# Patient Record
Sex: MALE | Race: WHITE | Employment: FULL TIME | URBAN - METROPOLITAN AREA
[De-identification: names, ages, dates, MRNs, and addresses within clinical notes are randomized per-mention and may not be internally consistent; named-entity substitution may affect disease eponyms.]

---

## 2017-05-12 ENCOUNTER — OFFICE VISIT (OUTPATIENT)
Dept: UROLOGY | Facility: CLINIC | Age: 32
End: 2017-05-12
Payer: COMMERCIAL

## 2017-05-12 VITALS
DIASTOLIC BLOOD PRESSURE: 68 MMHG | BODY MASS INDEX: 30.1 KG/M2 | WEIGHT: 215 LBS | HEIGHT: 71 IN | SYSTOLIC BLOOD PRESSURE: 114 MMHG | HEART RATE: 76 BPM

## 2017-05-12 DIAGNOSIS — Z30.2 ENCOUNTER FOR STERILIZATION: Primary | ICD-10-CM

## 2017-05-12 PROCEDURE — 99203 OFFICE O/P NEW LOW 30 MIN: CPT | Performed by: UROLOGY

## 2017-05-12 RX ORDER — DIAZEPAM 10 MG
10 TABLET ORAL EVERY 6 HOURS PRN
Qty: 1 TABLET | Refills: 0 | Status: SHIPPED | OUTPATIENT
Start: 2017-05-12 | End: 2019-07-08

## 2017-05-12 ASSESSMENT — PAIN SCALES - GENERAL: PAINLEVEL: NO PAIN (0)

## 2017-05-12 NOTE — MR AVS SNAPSHOT
After Visit Summary   5/12/2017    Yuniel Mckeon    MRN: [id number]           Patient Information     Date Of Birth          1985        Visit Information        Provider Department      5/12/2017 1:40 PM Duncan Akbar MD [facility] [place]        Today's Diagnoses     Encounter for sterilization    -  1      Care Instructions    [facility]  Vasectomy Information  [phone]    DATE OF PROCEDURE ___________________________________    TIME OF PROCEDURE ___________________________    TIME TO REPORT FOR PROCEDURE _______________________________    Your Physician:  _____ Marin Montesinos M.D.     _____ Duncan Akbar M.D.     _____ Toribio Bruno M.D.    LOCATION OF PROCEDURE:     _____ [facility]  _____ [address]   [address]   [address] [facility].  [address]    [place]   [address]    [place]   [address]    Preoperative Instructions:    _____ You may have breakfast on the morning of your procedure.  If your procedure is    in the afternoon, you may have lunch as well.    _____ You must have someone drive you home after the procedure if you have been    prescribed an oral sedative (valium).    _____ Do not take any aspirin, blood-thinning or anti-inflammatory medication for at    least 7-10 days before the procedure (this includes but is not limited to Advil,   Aleve, Ecotrin and Bufferin).      Postoperative Instructions Follow Vasectomy    Under routine circumstances, please note the following:    -No heavy lifting (over 15 lbs) for 48 hour.  -You may shower after 48 hours.  You may have a tub bath or use a swimming pool after one week postoperatively.  Your doctor will advise you if he feels it is helpful to soak in a bathtub postoperatively.  -Do not engage in intercourse for at least ten days and then proceed when comfortable.  -Wear an athletic supporter for 48 hours postoperatively or  "until any discomfort ceases.  -Your physician will instruct you regarding the use of ice in the recovery room or at home after the procedure, as necessary.  -Please remember as you resume your activity that you may experience some discomfor and/or swelling for the one to two weeks following the procedure.  If this occurs decrease activity and slightly elevate the scrotum (athletic supporter).  -As your stitches dissolve it may appear that the incision is \"gaping.\"  This is normal.    Necessary follow-up:  You do not need a follow up appointment unless you have problems after your procedure.  Please call our office at [phone] if you do.    At the time of your procedure you will be given the supplies for your post procedure follow up.  The test should be done AT LEAST THREE MONTHS AFTER your vasecomy.  During this time, be certain to maintain birth control measure!    Between the time of your procedure and the time that you have your first sperm count it is very important that you have a minimum of 30 ejaculations.  If you have any questions about this, please consult your physician.    If the sperm count that is done at three months is negative, you will be considered sterile.  Until, you are told that you are free to discontinue birth control you are considered fertile.    If your sperm counts reveal the presence of sperm, you will be advised to repeat the test until a negative result is obtained.     NOTE:  Please call our office one week after dropping off your sample for the result.  Test results will only be given to the patient.                 Follow-ups after your visit        Follow-up notes from your care team     Return for vasectomy.      Your next 10 appointments already scheduled     Oct 06, 2017  9:30 AM CDT   Vasectomy with Toribio Bruno MD   [facility] [place] ([facility])    [address]  [address]  [facility] " "[phone]              Who to contact     If you have questions or need follow up information about today's clinic visit or your schedule please contact [facility] [place] directly at [phone].  Normal or non-critical lab and imaging results will be communicated to you by Nukonahart, letter or phone within 4 business days after the clinic has received the results. If you do not hear from us within 7 days, please contact the clinic through Nukonahart or phone. If you have a critical or abnormal lab result, we will notify you by phone as soon as possible.  Submit refill requests through Joyus or call your pharmacy and they will forward the refill request to us. Please allow 3 business days for your refill to be completed.          Additional Information About Your Visit        Nukonahart Information     Joyus gives you secure access to your electronic health record. If you see a primary care provider, you can also send messages to your care team and make appointments. If you have questions, please call your primary care clinic.  If you do not have a primary care provider, please call [phone] and they will assist you.        Care EveryWhere ID     This is your Care EveryWhere ID. This could be used by other organizations to access your [place] medical records  [id number]        Your Vitals Were     Pulse Height BMI (Body Mass Index)             76 1.803 m (5' 11\") 29.99 kg/m2          Blood Pressure from Last 3 Encounters:   05/12/17 114/68   12/14/16 122/78   11/10/16 114/74    Weight from Last 3 Encounters:   05/12/17 97.5 kg (215 lb)   12/14/16 99.3 kg (219 lb)   11/10/16 97.5 kg (215 lb)              Today, you had the following     No orders found for display         Today's Medication Changes          These changes are accurate as of: 5/12/17 11:59 PM.  If you have any questions, ask your nurse or doctor.               Start taking these medicines.        " Dose/Directions    diazepam 10 MG tablet   Commonly known as:  VALIUM   Used for:  Encounter for sterilization   Started by:  Duncan Akbar MD        Dose:  10 mg   Take 1 tablet (10 mg) by mouth every 6 hours as needed for anxiety or sleep Take 30-60 minutes before procedure.  Do not operate a vehicle after taking this medication.   Quantity:  1 tablet   Refills:  0            Where to get your medicines      Some of these will need a paper prescription and others can be bought over the counter.  Ask your nurse if you have questions.     Bring a paper prescription for each of these medications     diazepam 10 MG tablet                Primary Care Provider Office Phone # Fax #    Scott Razo [phone]       [facility] [address] [place] [address]        Equal Access to Services     [facility] : Hadii boogie uribe hadasho Somariam, waaxda luqadaha, qaybta kaalmada adeegyada, waxay kerwinin haybrian casanova. So [facility] [phone].    ATENCIÓN: Si habla español, tiene a singh disposición servicios gratuitos de asistencia lingüística. [facility] [phone].    We comply with applicable federal civil rights laws and [place] laws. We do not discriminate on the basis of race, color, national origin, age, disability sex, sexual orientation or gender identity.            Thank you!     Thank you for choosing [facility] [place]  for your care. Our goal is always to provide you with excellent care. Hearing back from our patients is one way we can continue to improve our services. Please take a few minutes to complete the written survey that you may receive in the mail after your visit with us. Thank you!             Your Updated Medication List - Protect others around you: Learn how to safely use, store and throw away your medicines at www.disposemymeds.org.          This list is accurate as of: 5/12/17 11:59 PM.  Always use your most  recent med list.                   Brand Name Dispense Instructions for use Diagnosis    diazepam 10 MG tablet    VALIUM    1 tablet    Take 1 tablet (10 mg) by mouth every 6 hours as needed for anxiety or sleep Take 30-60 minutes before procedure.  Do not operate a vehicle after taking this medication.    Encounter for sterilization

## 2017-05-12 NOTE — LETTER
5/12/2017       RE: Yuniel Mckeon  [address] [facility] [address]     Dear Colleague,    Thank you for referring your patient, Yuniel Mckeon, to the [facility] [place] [facility]. Please see a copy of my visit note below.    History: This very pleasant 32-year-old gentleman, a see me in initial consultation today and is interested in vasectomy.  He is  with 3 children, (the third one is on the way)  Both he and his wife do not wish for further children.  His general health is otherwise satisfactory.  He has had a previous umbilical hernia repair    Past Medical History:   Diagnosis Date     Hernia, abdominal      PONV (postoperative nausea and vomiting)        Past Surgical History:   Procedure Laterality Date     APPENDECTOMY OPEN  2002     HERNIORRHAPHY VENTRAL N/A 11/10/2016    Procedure: HERNIORRHAPHY VENTRAL;  Surgeon: Matt Johnston MD;  Location: [facility] OR       Family History   Problem Relation Age of Onset     Unknown/Adopted Father      Unknown/Adopted Paternal Grandmother      Unknown/Adopted Paternal Grandfather        Social History     Social History     Marital status:      Spouse name: N/A     Number of children: N/A     Years of education: N/A     Occupational History     Not on file.     Social History Main Topics     Smoking status: Never Smoker     Smokeless tobacco: Never Used     Alcohol use 0.0 oz/week     0 Standard drinks or equivalent per week      Comment: Occas     Drug use: No     Sexual activity: Yes     Partners: Female     Other Topics Concern     Parent/Sibling W/ Cabg, Mi Or Angioplasty Before 65f 55m? No     Social History Narrative       Current Outpatient Prescriptions   Medication Sig Dispense Refill     diazepam (VALIUM) 10 MG tablet Take 1 tablet (10 mg) by mouth every 6 hours as needed for anxiety or sleep Take 30-60 minutes before procedure.  Do not operate a vehicle after taking  "this medication. 1 tablet 0       Review Of Systems:  Skin: negative  Eyes: negative  Ears/Nose/Throat: negative  Respiratory: No shortness of breath, dyspnea on exertion, cough, or hemoptysis  Cardiovascular: negative  Gastrointestinal: Umbilical hernia  Genitourinary: negative  Musculoskeletal: negative  Neurologic: negative  Psychiatric: negative  Hematologic/Lymphatic/Immunologic: negative  Endocrine: negative    Exam:  /68 (BP Location: Left arm, Patient Position: Chair, Cuff Size: Adult Regular)  Pulse 76  Ht 1.803 m (5' 11\")  Wt 97.5 kg (215 lb)  BMI 29.99 kg/m2    General Impression: Very pleasant gentleman in no acute distress, well-oriented in time place and person.    Mental status.  Normal    HEENT: There is no evidence of jaundice and the mucous membranes are normal    Skin: The skin is normal to examination    Lymph Nodes: There is no inguinal lymphadenopathy    Respiratory System: The respiratory cycle is normal    Cardiovascular: Not examined    Abdominal: Unremarkable but mildly obese abdomen, healed surgical sites as a result of umbilical hernia repair.  No evidence of any other hernia.    Extremities: There is no peripheral edema    Back and Flank: Not examined    Genital: Both vas deferens easily palpable.  Penis uncircumcised and normal size meatus    Rectal: Not examined    Neurologic:  There are no focal clinical abnormal neurological signs in the central, or peripheral nervous systems    Impression: I explained the procedure of vasectomy to the patient in detail today.  I went over all the potential side effects and complications associated with the procedure.  I explained the importance of not discontinuing normal contraceptive precautions until we have demonstrated a negative sperm count of the procedure.  I answered all the questions    Plan: Vasectomy    \"This dictation was performed with voice recognition software and may contain errors,  omissions and inadvertent word " "substitution.\"          Again, thank you for allowing me to participate in the care of your patient.      Sincerely,    Duncan Akbar MD      "

## 2017-05-12 NOTE — PROGRESS NOTES
History: This very pleasant 32-year-old gentleman, a see me in initial consultation today and is interested in vasectomy.  He is  with 3 children, (the third one is on the way)  Both he and his wife do not wish for further children.  His general health is otherwise satisfactory.  He has had a previous umbilical hernia repair    Past Medical History:   Diagnosis Date     Hernia, abdominal      PONV (postoperative nausea and vomiting)        Past Surgical History:   Procedure Laterality Date     APPENDECTOMY OPEN  2002     HERNIORRHAPHY VENTRAL N/A 11/10/2016    Procedure: HERNIORRHAPHY VENTRAL;  Surgeon: Matt Johnston MD;  Location: [facility] OR       Family History   Problem Relation Age of Onset     Unknown/Adopted Father      Unknown/Adopted Paternal Grandmother      Unknown/Adopted Paternal Grandfather        Social History     Social History     Marital status:      Spouse name: N/A     Number of children: N/A     Years of education: N/A     Occupational History     Not on file.     Social History Main Topics     Smoking status: Never Smoker     Smokeless tobacco: Never Used     Alcohol use 0.0 oz/week     0 Standard drinks or equivalent per week      Comment: Occas     Drug use: No     Sexual activity: Yes     Partners: Female     Other Topics Concern     Parent/Sibling W/ Cabg, Mi Or Angioplasty Before 65f 55m? No     Social History Narrative       Current Outpatient Prescriptions   Medication Sig Dispense Refill     diazepam (VALIUM) 10 MG tablet Take 1 tablet (10 mg) by mouth every 6 hours as needed for anxiety or sleep Take 30-60 minutes before procedure.  Do not operate a vehicle after taking this medication. 1 tablet 0       Review Of Systems:  Skin: negative  Eyes: negative  Ears/Nose/Throat: negative  Respiratory: No shortness of breath, dyspnea on exertion, cough, or hemoptysis  Cardiovascular: negative  Gastrointestinal: Umbilical hernia  Genitourinary: negative  Musculoskeletal:  "negative  Neurologic: negative  Psychiatric: negative  Hematologic/Lymphatic/Immunologic: negative  Endocrine: negative    Exam:  /68 (BP Location: Left arm, Patient Position: Chair, Cuff Size: Adult Regular)  Pulse 76  Ht 1.803 m (5' 11\")  Wt 97.5 kg (215 lb)  BMI 29.99 kg/m2    General Impression: Very pleasant gentleman in no acute distress, well-oriented in time place and person.    Mental status.  Normal    HEENT: There is no evidence of jaundice and the mucous membranes are normal    Skin: The skin is normal to examination    Lymph Nodes: There is no inguinal lymphadenopathy    Respiratory System: The respiratory cycle is normal    Cardiovascular: Not examined    Abdominal: Unremarkable but mildly obese abdomen, healed surgical sites as a result of umbilical hernia repair.  No evidence of any other hernia.    Extremities: There is no peripheral edema    Back and Flank: Not examined    Genital: Both vas deferens easily palpable.  Penis uncircumcised and normal size meatus    Rectal: Not examined    Neurologic:  There are no focal clinical abnormal neurological signs in the central, or peripheral nervous systems    Impression: I explained the procedure of vasectomy to the patient in detail today.  I went over all the potential side effects and complications associated with the procedure.  I explained the importance of not discontinuing normal contraceptive precautions until we have demonstrated a negative sperm count of the procedure.  I answered all the questions    Plan: Vasectomy    \"This dictation was performed with voice recognition software and may contain errors,  omissions and inadvertent word substitution.\"        "

## 2017-08-09 NOTE — PATIENT INSTRUCTIONS
[facility]  Vasectomy Information  [phone]    DATE OF PROCEDURE ___________________________________    TIME OF PROCEDURE ___________________________    TIME TO REPORT FOR PROCEDURE _______________________________    Your Physician:  _____ Marin Montesinos M.D.     _____ Duncan Akbar M.D.     _____ Toribio Bruno M.D.    LOCATION OF PROCEDURE:     _____ [facility]  _____ [address]   [address]   [address] [facility].  [address]    [place]   [address]    [place]   [address]    Preoperative Instructions:    _____ You may have breakfast on the morning of your procedure.  If your procedure is    in the afternoon, you may have lunch as well.    _____ You must have someone drive you home after the procedure if you have been    prescribed an oral sedative (valium).    _____ Do not take any aspirin, blood-thinning or anti-inflammatory medication for at    least 7-10 days before the procedure (this includes but is not limited to Advil,   Aleve, Ecotrin and Bufferin).      Postoperative Instructions Follow Vasectomy    Under routine circumstances, please note the following:    -No heavy lifting (over 15 lbs) for 48 hour.  -You may shower after 48 hours.  You may have a tub bath or use a swimming pool after one week postoperatively.  Your doctor will advise you if he feels it is helpful to soak in a bathtub postoperatively.  -Do not engage in intercourse for at least ten days and then proceed when comfortable.  -Wear an athletic supporter for 48 hours postoperatively or until any discomfort ceases.  -Your physician will instruct you regarding the use of ice in the recovery room or at home after the procedure, as necessary.  -Please remember as you resume your activity that you may experience some discomfor and/or swelling for the one to two weeks following the procedure.  If this occurs decrease activity and slightly elevate the scrotum (athletic  "supporter).  -As your stitches dissolve it may appear that the incision is \"gaping.\"  This is normal.    Necessary follow-up:  You do not need a follow up appointment unless you have problems after your procedure.  Please call our office at [phone] if you do.    At the time of your procedure you will be given the supplies for your post procedure follow up.  The test should be done AT LEAST THREE MONTHS AFTER your vasecomy.  During this time, be certain to maintain birth control measure!    Between the time of your procedure and the time that you have your first sperm count it is very important that you have a minimum of 30 ejaculations.  If you have any questions about this, please consult your physician.    If the sperm count that is done at three months is negative, you will be considered sterile.  Until, you are told that you are free to discontinue birth control you are considered fertile.    If your sperm counts reveal the presence of sperm, you will be advised to repeat the test until a negative result is obtained.     NOTE:  Please call our office one week after dropping off your sample for the result.  Test results will only be given to the patient.         "

## 2017-10-06 ENCOUNTER — OFFICE VISIT (OUTPATIENT)
Dept: UROLOGY | Facility: CLINIC | Age: 32
End: 2017-10-06
Payer: COMMERCIAL

## 2017-10-06 VITALS
HEART RATE: 80 BPM | DIASTOLIC BLOOD PRESSURE: 70 MMHG | SYSTOLIC BLOOD PRESSURE: 120 MMHG | BODY MASS INDEX: 29.99 KG/M2 | WEIGHT: 215 LBS

## 2017-10-06 DIAGNOSIS — Z30.2 ENCOUNTER FOR STERILIZATION: Primary | ICD-10-CM

## 2017-10-06 PROCEDURE — 88302 TISSUE EXAM BY PATHOLOGIST: CPT | Performed by: UROLOGY

## 2017-10-06 PROCEDURE — 55250 REMOVAL OF SPERM DUCT(S): CPT | Performed by: UROLOGY

## 2017-10-06 RX ORDER — HYDROCODONE BITARTRATE AND ACETAMINOPHEN 5; 325 MG/1; MG/1
1 TABLET ORAL EVERY 4 HOURS PRN
Qty: 10 TABLET | Refills: 0 | Status: SHIPPED | OUTPATIENT
Start: 2017-10-06 | End: 2019-07-08

## 2017-10-06 NOTE — LETTER
10/6/2017       RE: Yuniel Mckeon  [address] [facility] [address]     Dear Colleague,    Thank you for referring your patient, Yuinel Mckeon, to the [facility] [place] [facility]. Please see a copy of my visit note below.    OFFICE VASECTOMY OPERATIVE NOTE    DATE: 10/06/17  PATIENT: Yuniel Mckeon    YOB: 1985    Yuniel Mckeon is a 32 year old male.  He has 2 children with a 3rd on the way and he wishes a vasectomy for birth control.  He has read the brochure and he has shaved himself.  I reviewed the vasectomy procedure with him explaining that it would be done with a local anesthetic given just in the location where the vasectomy would be done.  It would be done through scalpel-less incisions with the removal of segments of the vasa, cauterization of the ends, and burying the ends separate with sutures.      Pt. Understands:  1/1000-1/3000 risk of future pregnancy even with perfectly done vasectomy  -vasectomy is a permanent procedure    -he may cryopreserve sperm if he wishes   -1-5% risk of post-vasectomy pain syndrome   -1-5% risk of complication, primarily infection or bleeding  - he needs to have a semen sample that shows no sperm before getting approval for unprotected intercourse.      Complications such as bleeding, infection, and damage to other tissues in the area were discussed.  I recommended that an ice bag be placed on the scrotum off and on tonight to help reduce pain and swelling.      He was reminded that he was not sterile immediately after the vasectomy that it would take at least 20 ejaculations to empty the vas of any remaining sperm.  He was not to provide a semen sample until after the 20th ejaculation and not before 12 weeks after the vas. He was  to fulfill both of those requirements.   He understands it is his responsibility to find out the results of the vas before proceeding with  intercourse without birth control protection.  Other items discussed were activity afterwards, returning to work, voluntary physical activity,  resuming sexual activity, clothing to wear, bathing, and care of the vas site and expected changes in the site as healing progresses.  After signing the permit, bilateral vasectomy was done as described through scalpel-less incisions.       ANESTHESIA: Local    DETAILS OF PROCEDURE: The risks of the procedure were explained in detail to the patient and informed consent was obtained. The patient was placed supine on the procedure table and the penis and scrotum were prepped and draped in the standard sterile fashion. The right vas deferens was isolated and brought up to the median raphe of the scrotum. 1% lidocaine local anesthesia was used to infiltrate the skin and the spermatic cord. A sharp hemostat was used to make a skin puncture. Adventitial tissues were swept away from the vas. A 1 cm segment of the vas was excised and sent for pathology. The proximal and distal lumina of the vas were cauterized and then each segment was tied off in a knuckling-fashion with a 3-0 chromic suture. Hemostasis was ensured and the segments were released back into the scrotum. Next the left vas was brought up to the same incision and a vasectomy was performed in the similar fashion. At the end of the procedure a single 3-0 chromic suture was placed in the skin.     COMPLICATIONS: None    TAKE HOME MEDICATIONS: Vicodin, 1-2 tabs every 6 hours, PRN    DISMISSAL INSTRUCTIONS:  - Ice pack to scrotum 15 to 20 minutes each hour awake for 36 to 40 hours.  - No strenuous activity or ejaculation for 14 days.  - No unprotected sexual activity until proven azoospermia on semen samples at 3 months.  - Referred to patient handout for normal postop expectations and indications to contact nurse or physician.        Again, thank you for allowing me to participate in the care of your patient.       Sincerely,    Toribio Bruno MD

## 2017-10-06 NOTE — MR AVS SNAPSHOT
After Visit Summary   10/6/2017    Yuniel Mckeon    MRN: [id number]           Patient Information     Date Of Birth          1985        Visit Information        Provider Department      10/6/2017 9:30 AM Toribio Bruno MD [facility] [place]        Today's Diagnoses     Encounter for sterilization    -  1      Care Instructions    POST VASECTOMY INSTRUCTIONS    1.) If you have any concerns or questions, please contact our office at [phone].     2.) It is okay to take a shower, however, do not soak in water (bath,swimming, hot tub,etc....) until your incision is healed.    3.) You might notice some swelling, mild bruising, and discomfort for several days after your vasectomy. This is to be expected. For at least the next 24 hours, an ice pack should be applied for 20 minutes every hour that you are awake. Ice will help with discomfort and swelling. Do not place directly on the skin.    4.) No intercourse, strenuous activity or exercise for at least 7-10 days, even if you feel fine.    5.) You need to wear good scrotal support while you are healing. We strongly recommend an athletic supporter or a pair of regular briefs that are one size too small. Boxer briefs do not offer enough support.    6.) Tylenol as directed on the bottle is preferred for discomfort. Please avoid any blood thinning products such as ibuprofen and aspirin (Motrin, Advil, Excedrin, Aleve, ect..) for at least the next week.    7.) It is normal to have mild drainage from the incision area for several days. However, please contact our office if you notice: bright red blood that does not stop after three days, increased pain, heat at the incision, red streaks, foul smelling discharge, or if you start to run a fever.     8.) YOU MUST CONTINUE BIRTH CONTROL UNTIL WE CONFIRM YOUR STERILITY.  This process can take up to a year to complete (rare occurrence).     9.) You have been  given a form with specimen cup and instructions for your follow up specimen. You will be cleared once we receive ONE negative specimen. If your specimen comes back positive (sperm seen) you will be asked to repeat the test. This does not mean that your vasectomy has failed.           Follow-ups after your visit        Future tests that were ordered for you today     Open Future Orders        Priority Expected Expires Ordered    Semen Analysis Post Vasectomy [[id number]] Routine  10/6/2018 10/6/2017            Who to contact     If you have questions or need follow up information about today's clinic visit or your schedule please contact [facility] [place] directly at [phone].  Normal or non-critical lab and imaging results will be communicated to you by MyChart, letter or phone within 4 business days after the clinic has received the results. If you do not hear from us within 7 days, please contact the clinic through Phoneplust or phone. If you have a critical or abnormal lab result, we will notify you by phone as soon as possible.  Submit refill requests through [employer] or call your pharmacy and they will forward the refill request to us. Please allow 3 business days for your refill to be completed.          Additional Information About Your Visit        [employer] Information     [employer] gives you secure access to your electronic health record. If you see a primary care provider, you can also send messages to your care team and make appointments. If you have questions, please call your primary care clinic.  If you do not have a primary care provider, please call [phone] and they will assist you.        Care EveryWhere ID     This is your Care EveryWhere ID. This could be used by other organizations to access your [place] medical records  [id number]        Your Vitals Were     Pulse BMI (Body Mass Index)                80 29.99 kg/m2           Blood Pressure from Last 3  Encounters:   10/06/17 120/70   05/12/17 114/68   12/14/16 122/78    Weight from Last 3 Encounters:   10/06/17 97.5 kg (215 lb)   05/12/17 97.5 kg (215 lb)   12/14/16 99.3 kg (219 lb)              We Performed the Following     Surgical pathology exam [[id number]]          Today's Medication Changes          These changes are accurate as of: 10/6/17 10:11 AM.  If you have any questions, ask your nurse or doctor.               Start taking these medicines.        Dose/Directions    HYDROcodone-acetaminophen 5-325 MG per tablet   Commonly known as:  NORCO   Used for:  Encounter for sterilization        Dose:  1 tablet   Take 1 tablet by mouth every 4 hours as needed for pain maximum 6 tablet(s) per day   Quantity:  10 tablet   Refills:  0            Where to get your medicines      Some of these will need a paper prescription and others can be bought over the counter.  Ask your nurse if you have questions.     Bring a paper prescription for each of these medications     HYDROcodone-acetaminophen 5-325 MG per tablet                Primary Care Provider Office Phone # Fax #    Scott Razo [phone]       [place] [id number] [facility]   [facility] [id number]        Equal Access to Services     [facility] CenterSUZETTE AH: Hadii boogie rosaso Soomaali, waaxda luqadaha, qaybta kaalmada adeegyada, jemal casanova. So [facility] [phone].    ATENCIÓN: Si habla español, tiene a singh disposición servicios gratuitos de asistencia lingüística. Krista al [phone].    We comply with applicable federal civil rights laws and [place] laws. We do not discriminate on the basis of race, color, national origin, age, disability, sex, sexual orientation, or gender identity.            Thank you!     Thank you for choosing [facility] [place]  for your care. Our goal is always to provide you with excellent care. Hearing back from our patients is one way we can  continue to improve our services. Please take a few minutes to complete the written survey that you may receive in the mail after your visit with us. Thank you!             Your Updated Medication List - Protect others around you: Learn how to safely use, store and throw away your medicines at www.disposemymeds.org.          This list is accurate as of: 10/6/17 10:11 AM.  Always use your most recent med list.                   Brand Name Dispense Instructions for use Diagnosis    diazepam 10 MG tablet    VALIUM    1 tablet    Take 1 tablet (10 mg) by mouth every 6 hours as needed for anxiety or sleep Take 30-60 minutes before procedure.  Do not operate a vehicle after taking this medication.    Encounter for sterilization       HYDROcodone-acetaminophen 5-325 MG per tablet    NORCO    10 tablet    Take 1 tablet by mouth every 4 hours as needed for pain maximum 6 tablet(s) per day    Encounter for sterilization

## 2017-10-06 NOTE — PROGRESS NOTES
OFFICE VASECTOMY OPERATIVE NOTE    DATE: 10/06/17  PATIENT: Yuniel Mckeon    YOB: 1985    Yuniel Mckeon is a 32 year old male.  He has 2 children with a 3rd on the way and he wishes a vasectomy for birth control.  He has read the brochure and he has shaved himself.  I reviewed the vasectomy procedure with him explaining that it would be done with a local anesthetic given just in the location where the vasectomy would be done.  It would be done through scalpel-less incisions with the removal of segments of the vasa, cauterization of the ends, and burying the ends separate with sutures.      Pt. Understands:  1/1000-1/3000 risk of future pregnancy even with perfectly done vasectomy  -vasectomy is a permanent procedure    -he may cryopreserve sperm if he wishes   -1-5% risk of post-vasectomy pain syndrome   -1-5% risk of complication, primarily infection or bleeding  - he needs to have a semen sample that shows no sperm before getting approval for unprotected intercourse.      Complications such as bleeding, infection, and damage to other tissues in the area were discussed.  I recommended that an ice bag be placed on the scrotum off and on tonight to help reduce pain and swelling.      He was reminded that he was not sterile immediately after the vasectomy that it would take at least 20 ejaculations to empty the vas of any remaining sperm.  He was not to provide a semen sample until after the 20th ejaculation and not before 12 weeks after the vas. He was  to fulfill both of those requirements.   He understands it is his responsibility to find out the results of the vas before proceeding with intercourse without birth control protection.  Other items discussed were activity afterwards, returning to work, voluntary physical activity,  resuming sexual activity, clothing to wear, bathing, and care of the vas site and expected changes in the site as healing progresses.  After signing the permit,  bilateral vasectomy was done as described through scalpel-less incisions.       ANESTHESIA: Local    DETAILS OF PROCEDURE: The risks of the procedure were explained in detail to the patient and informed consent was obtained. The patient was placed supine on the procedure table and the penis and scrotum were prepped and draped in the standard sterile fashion. The right vas deferens was isolated and brought up to the median raphe of the scrotum. 1% lidocaine local anesthesia was used to infiltrate the skin and the spermatic cord. A sharp hemostat was used to make a skin puncture. Adventitial tissues were swept away from the vas. A 1 cm segment of the vas was excised and sent for pathology. The proximal and distal lumina of the vas were cauterized and then each segment was tied off in a knuckling-fashion with a 3-0 chromic suture. Hemostasis was ensured and the segments were released back into the scrotum. Next the left vas was brought up to the same incision and a vasectomy was performed in the similar fashion. At the end of the procedure a single 3-0 chromic suture was placed in the skin.     COMPLICATIONS: None    TAKE HOME MEDICATIONS: Vicodin, 1-2 tabs every 6 hours, PRN    DISMISSAL INSTRUCTIONS:  - Ice pack to scrotum 15 to 20 minutes each hour awake for 36 to 40 hours.  - No strenuous activity or ejaculation for 14 days.  - No unprotected sexual activity until proven azoospermia on semen samples at 3 months.  - Referred to patient handout for normal postop expectations and indications to contact nurse or physician.    M.D.: Toribio Bruno M.D.

## 2017-10-06 NOTE — PATIENT INSTRUCTIONS
POST VASECTOMY INSTRUCTIONS    1.) If you have any concerns or questions, please contact our office at [phone].     2.) It is okay to take a shower, however, do not soak in water (bath,swimming, hot tub,etc....) until your incision is healed.    3.) You might notice some swelling, mild bruising, and discomfort for several days after your vasectomy. This is to be expected. For at least the next 24 hours, an ice pack should be applied for 20 minutes every hour that you are awake. Ice will help with discomfort and swelling. Do not place directly on the skin.    4.) No intercourse, strenuous activity or exercise for at least 7-10 days, even if you feel fine.    5.) You need to wear good scrotal support while you are healing. We strongly recommend an athletic supporter or a pair of regular briefs that are one size too small. Boxer briefs do not offer enough support.    6.) Tylenol as directed on the bottle is preferred for discomfort. Please avoid any blood thinning products such as ibuprofen and aspirin (Motrin, Advil, Excedrin, Aleve, ect..) for at least the next week.    7.) It is normal to have mild drainage from the incision area for several days. However, please contact our office if you notice: bright red blood that does not stop after three days, increased pain, heat at the incision, red streaks, foul smelling discharge, or if you start to run a fever.     8.) YOU MUST CONTINUE BIRTH CONTROL UNTIL WE CONFIRM YOUR STERILITY.  This process can take up to a year to complete (rare occurrence).     9.) You have been given a form with specimen cup and instructions for your follow up specimen. You will be cleared once we receive ONE negative specimen. If your specimen comes back positive (sperm seen) you will be asked to repeat the test. This does not mean that your vasectomy has failed.

## 2017-10-06 NOTE — NURSING NOTE
Pt has signed the consent form today confirming that a VASECTOMY is the correct procedure today. I verbally confirmed the patient s identity using two indicators, that the pt has started any medication as prescribed for this procedure, relevant allergies, that they have not used blood thinning products in the last 7 - 10 days, and that the correct equipment was available. Immediately prior to starting the procedure I conducted the Time Out with the MD and re-confirmed the patient s name and procedure. Pathology ordered and sent to lab. Post-procedure information, also specimen collection cup with instructions, given to pt at time of check out.  The following medication was used:     MEDICATION:  2% Lidocaine  ROUTE: SQ  SITE: SCROTUM  DOSE: 20ml  LOT #: [id number]  : hospira  EXPIRATION DATE: 5/2019  NDC#: [id number]   Was there drug waste? NO    TAMIKA Blackman  October 6, 2017

## 2017-10-10 LAB — COPATH REPORT: NORMAL

## 2017-10-13 ENCOUNTER — TELEPHONE (OUTPATIENT)
Dept: UROLOGY | Facility: CLINIC | Age: 32
End: 2017-10-13

## 2017-10-13 NOTE — TELEPHONE ENCOUNTER
Urology    Spoke on phone re:pathology  Right vas deferens sample normal, left side not normal vas deferens  During his procedure last week I removed what appeared to be a very small and atretic vas deferens on the left  Discussed options: surgical exploration vs waiting for semen specimen to see if just doing right side made him sterile  He will wait 3 months and bring in semen specimen, understands he may need 2nd procedure in the future

## 2019-06-17 DIAGNOSIS — Z30.2 ENCOUNTER FOR STERILIZATION: Primary | ICD-10-CM

## 2019-06-17 LAB — SEMEN ANALYSIS P VAS PNL: ABNORMAL

## 2019-06-17 PROCEDURE — 89321 SEMEN ANAL SPERM DETECTION: CPT | Performed by: UROLOGY

## 2019-06-19 ENCOUNTER — TELEPHONE (OUTPATIENT)
Dept: SURGERY | Facility: CLINIC | Age: 34
End: 2019-06-19

## 2019-06-19 NOTE — TELEPHONE ENCOUNTER
Spoke on phone re:semen analysis results  Still sperm in the ejaculate more than 1 year after right sided vasectomy. Could not feel a vas deferens on the left side, but this means there likely is a small vas deferens on the left side that needs to be ligated  I recommended scrotal exploration with left sided vasectomy in the operating room  Procedure discussed and he wishes to proceed

## 2019-06-20 ENCOUNTER — HOSPITAL ENCOUNTER (OUTPATIENT)
Facility: CLINIC | Age: 34
End: 2019-06-20
Attending: UROLOGY | Admitting: UROLOGY
Payer: COMMERCIAL

## 2019-07-08 RX ORDER — ACETAMINOPHEN 500 MG
500-1000 TABLET ORAL EVERY 6 HOURS PRN
COMMUNITY
End: 2019-07-10 | Stop reason: HOSPADM

## 2019-07-08 RX ORDER — IBUPROFEN 600 MG/1
600 TABLET, FILM COATED ORAL EVERY 6 HOURS PRN
COMMUNITY
End: 2019-07-10 | Stop reason: HOSPADM

## 2019-07-09 ENCOUNTER — OFFICE VISIT (OUTPATIENT)
Dept: FAMILY MEDICINE | Facility: CLINIC | Age: 34
End: 2019-07-09
Payer: COMMERCIAL

## 2019-07-09 VITALS
BODY MASS INDEX: 31.36 KG/M2 | HEART RATE: 65 BPM | HEIGHT: 71 IN | SYSTOLIC BLOOD PRESSURE: 122 MMHG | TEMPERATURE: 98.4 F | WEIGHT: 224 LBS | OXYGEN SATURATION: 96 % | DIASTOLIC BLOOD PRESSURE: 82 MMHG

## 2019-07-09 DIAGNOSIS — M72.2 PLANTAR FASCIITIS: ICD-10-CM

## 2019-07-09 DIAGNOSIS — E66.811 OBESITY (BMI 30.0-34.9): ICD-10-CM

## 2019-07-09 DIAGNOSIS — Z01.818 PREOP GENERAL PHYSICAL EXAM: Primary | ICD-10-CM

## 2019-07-09 DIAGNOSIS — Z98.52 HISTORY OF VASECTOMY: ICD-10-CM

## 2019-07-09 PROCEDURE — 99214 OFFICE O/P EST MOD 30 MIN: CPT | Performed by: PHYSICIAN ASSISTANT

## 2019-07-09 ASSESSMENT — MIFFLIN-ST. JEOR: SCORE: 1968.19

## 2019-07-09 NOTE — PROGRESS NOTES
[facility]  [address]  [facility] [address]  [phone]  Dept: [phone]    PRE-OP EVALUATION:  Today's date: 2019    Yuniel Mckeon (: 1985) presents for pre-operative evaluation assessment as requested by Dr. Toribio Bruno.  He requires evaluation and anesthesia risk assessment prior to undergoing surgery/procedure for treatment of Contraception .    Proposed Surgery/ Procedure: VASECTOMY  Date of Surgery/ Procedure: 2019  Time of Surgery/ Procedure: 1245  Hospital/Surgical Facility: [facility]  Fax number for surgical facility:   Primary Physician: Scott Razo  Type of Anesthesia Anticipated: General    Patient has a Health Care Directive or Living Will:  NO    1. NO - Do you have a history of heart attack, stroke, stent, bypass or surgery on an artery in the head, neck, heart or legs?  2. NO - Do you ever have any pain or discomfort in your chest?  3. NO - Do you have a history of  Heart Failure?  4. NO - Are you troubled by shortness of breath when: walking on the level, up a slight hill or at night?  5. NO - Do you currently have a cold, bronchitis or other respiratory infection?  6. NO - Do you have a cough, shortness of breath or wheezing?  7. NO - Do you sometimes get pains in the calves of your legs when you walk?  8. NO - Do you or anyone in your family have previous history of blood clots?  9. NO - Do you or does anyone in your family have a serious bleeding problem such as prolonged bleeding following surgeries or cuts?  10. NO - Have you ever had problems with anemia or been told to take iron pills?  11. NO - Have you had any abnormal blood loss such as black, tarry or bloody stools, or abnormal vaginal bleeding?  12. NO - Have you ever had a blood transfusion?  13. NO - Have you or any of your relatives ever had problems with anesthesia?  14. NO - Do you have sleep apnea, excessive snoring or daytime drowsiness?  15. NO - Do you have  any prosthetic heart valves?  16. NO - Do you have prosthetic joints?  17. NO - Is there any chance that you may be pregnant?      HPI:     HPI related to upcoming procedure: Yuniel is a 35 yo male here today for pre-op. He underwent vasectomy almost 2 yrs ago, but recent semen analysis showed that he still had sperm present. Thus, now pursuing repeat vasectomy as this suggests he still has a small L vas deferens even though this was not found on prior vasectomy. Evaluated by urology who advised scrotal exploration in the operating room.       See problem list for active medical problems.  Problems all longstanding and stable, except as noted/documented.  See ROS for pertinent symptoms related to these conditions.      MEDICAL HISTORY:     Patient Active Problem List    Diagnosis Date Noted     Obesity (BMI 30.0-34.9) 10/31/2016     Priority: Medium      Past Medical History:   Diagnosis Date     Hernia, abdominal      PONV (postoperative nausea and vomiting)     age 18     Ventral hernia without obstruction or gangrene 10/31/2016     Past Surgical History:   Procedure Laterality Date     APPENDECTOMY OPEN  2002     GENITOURINARY SURGERY      vasectomy     HERNIORRHAPHY VENTRAL N/A 11/10/2016    Procedure: HERNIORRHAPHY VENTRAL;  Surgeon: Matt Johnston MD;  Location: [facility] OR     Current Outpatient Medications   Medication Sig Dispense Refill     acetaminophen (TYLENOL) 500 MG tablet Take 500-1,000 mg by mouth every 6 hours as needed for mild pain       ibuprofen (ADVIL/MOTRIN) 600 MG tablet Take 600 mg by mouth every 6 hours as needed for moderate pain       OTC products: none    No Known Allergies     Latex Allergy: NO    Social History     Tobacco Use     Smoking status: Never Smoker     Smokeless tobacco: Never Used   Substance Use Topics     Alcohol use: Yes     Alcohol/week: 0.0 oz     Comment: 1-2 drinks per week average     History   Drug Use No       REVIEW OF SYSTEMS:   CONSTITUTIONAL: NEGATIVE for fever,  "chills, change in weight  INTEGUMENTARY/SKIN: NEGATIVE for worrisome rashes, moles or lesions  EYES: NEGATIVE for vision changes or irritation  ENT/MOUTH: NEGATIVE for ear, mouth and throat problems  RESP: NEGATIVE for significant cough or SOB  CV: NEGATIVE for chest pain, palpitations or peripheral edema  GI: NEGATIVE for nausea, abdominal pain, heartburn, or change in bowel habits  : NEGATIVE for frequency, dysuria, or hematuria  MUSCULOSKELETAL: + for R heel pain for the past 2 weeks. No change in activity level, but is constantly running with his 3 boys. Icing helps it.   NEURO: NEGATIVE for weakness, dizziness or paresthesias  ENDOCRINE: NEGATIVE for temperature intolerance, skin/hair changes  HEME: NEGATIVE for bleeding problems  PSYCHIATRIC: NEGATIVE for changes in mood or affect    EXAM:   /82 (BP Location: Right arm, Cuff Size: Adult Regular)   Pulse 65   Temp 98.4  F (36.9  C) (Oral)   Ht 1.803 m (5' 11\")   Wt 101.6 kg (224 lb)   SpO2 96%   BMI 31.24 kg/m      GENERAL APPEARANCE: healthy, alert and no distress     EYES: EOMI,  PERRL     HENT: ear canals and TM's normal and nose and mouth without ulcers or lesions     NECK: no adenopathy, no asymmetry, masses, or scars and thyroid normal to palpation     RESP: lungs clear to auscultation - no rales, rhonchi or wheezes     CV: regular rates and rhythm, normal S1 S2, no S3 or S4 and no murmur, click or rub     ABDOMEN:  soft, nontender, no HSM or masses and bowel sounds normal     MS: extremities normal- no gross deformities noted. Is TTP along plantar fascia insertion onto heel of R foot. No other tenderness.     SKIN: no suspicious lesions or rashes     NEURO: Normal strength and tone, sensory exam grossly normal, mentation intact and speech normal     PSYCH: mentation appears normal. and affect normal/bright     LYMPHATICS: No cervical adenopathy    DIAGNOSTICS:   EKG: Not indicated due to non-vascular surgery and low risk of event (age <65 " and without cardiac risk factors)    No results for input(s): HGB, PLT, INR, NA, POTASSIUM, CR, A1C in the last 76662 hours.     IMPRESSION:   Reason for surgery/procedure: vasectomy  Diagnosis/reason for consult: pre-op, obesity    The proposed surgical procedure is considered LOW risk.    REVISED CARDIAC RISK INDEX  The patient has the following serious cardiovascular risks for perioperative complications such as (MI, PE, VFib and 3  AV Block):  No serious cardiac risks  INTERPRETATION: 0 risks: Class I (very low risk - 0.4% complication rate)    The patient has the following additional risks for perioperative complications:  No identified additional risks      ICD-10-CM    1. Preop general physical exam Z01.818    2. Obesity (BMI 30.0-34.9) E66.9    3. History of vasectomy Z98.52    4. Plantar fasciitis M72.2        RECOMMENDATIONS:       APPROVAL GIVEN to proceed with proposed procedure, without further diagnostic evaluation.    We also discussed that his R heel pain is c/w plantar fasciitis. Reviewed conservative management with supportive shoes, ice and NSAIDs (after surgery) and can consider podiatry referral if not improving.       Signed Electronically by: Christine Tracy PA-C    Copy of this evaluation report is provided to requesting physician.    [facility] Preop Guidelines    Revised Cardiac Risk Index

## 2019-07-10 RX ORDER — CEFAZOLIN SODIUM 1 G/3ML
1 INJECTION, POWDER, FOR SOLUTION INTRAMUSCULAR; INTRAVENOUS SEE ADMIN INSTRUCTIONS
Status: CANCELLED | OUTPATIENT
Start: 2019-07-10

## 2019-07-10 RX ORDER — CEFAZOLIN SODIUM 2 G/100ML
2 INJECTION, SOLUTION INTRAVENOUS
Status: CANCELLED | OUTPATIENT
Start: 2019-07-10

## 2020-02-10 ENCOUNTER — HOSPITAL ENCOUNTER (OUTPATIENT)
Facility: CLINIC | Age: 35
End: 2020-02-10
Attending: UROLOGY | Admitting: UROLOGY
Payer: COMMERCIAL

## 2020-02-17 ENCOUNTER — HEALTH MAINTENANCE LETTER (OUTPATIENT)
Age: 35
End: 2020-02-17

## 2020-08-02 ENCOUNTER — APPOINTMENT (OUTPATIENT)
Dept: CT IMAGING | Facility: CLINIC | Age: 35
End: 2020-08-02
Attending: NURSE PRACTITIONER
Payer: COMMERCIAL

## 2020-08-02 ENCOUNTER — HOSPITAL ENCOUNTER (EMERGENCY)
Facility: CLINIC | Age: 35
Discharge: HOME | End: 2020-08-02
Attending: NURSE PRACTITIONER | Admitting: NURSE PRACTITIONER
Payer: COMMERCIAL

## 2020-08-02 VITALS
OXYGEN SATURATION: 95 % | TEMPERATURE: 98.1 F | SYSTOLIC BLOOD PRESSURE: 146 MMHG | RESPIRATION RATE: 16 BRPM | BODY MASS INDEX: 29.99 KG/M2 | DIASTOLIC BLOOD PRESSURE: 92 MMHG | WEIGHT: 215 LBS | HEART RATE: 87 BPM

## 2020-08-02 DIAGNOSIS — R10.30 LOWER ABDOMINAL PAIN: ICD-10-CM

## 2020-08-02 LAB
ALBUMIN SERPL-MCNC: 4.1 G/DL (ref 3.4–5)
ALBUMIN UR-MCNC: 50 MG/DL
ALP SERPL-CCNC: 105 U/L (ref 40–150)
ALT SERPL W P-5'-P-CCNC: 31 U/L (ref 0–70)
ANION GAP SERPL CALCULATED.3IONS-SCNC: 6 MMOL/L (ref 3–14)
APPEARANCE UR: CLEAR
AST SERPL W P-5'-P-CCNC: 29 U/L (ref 0–45)
BILIRUB SERPL-MCNC: 0.8 MG/DL (ref 0.2–1.3)
BILIRUB UR QL STRIP: NEGATIVE
BUN SERPL-MCNC: 13 MG/DL (ref 7–30)
CALCIUM SERPL-MCNC: 8.8 MG/DL (ref 8.5–10.1)
CHLORIDE SERPL-SCNC: 106 MMOL/L (ref 94–109)
CO2 SERPL-SCNC: 26 MMOL/L (ref 20–32)
COLOR UR AUTO: YELLOW
CREAT SERPL-MCNC: 0.94 MG/DL (ref 0.66–1.25)
ERYTHROCYTE [DISTWIDTH] IN BLOOD BY AUTOMATED COUNT: 12.4 % (ref 10–15)
GFR SERPL CREATININE-BSD FRML MDRD: >90 ML/MIN/{1.73_M2}
GLUCOSE SERPL-MCNC: 129 MG/DL (ref 70–99)
GLUCOSE UR STRIP-MCNC: NEGATIVE MG/DL
HCT VFR BLD AUTO: 47.8 % (ref 40–53)
HGB BLD-MCNC: 15.6 G/DL (ref 13.3–17.7)
HGB UR QL STRIP: NEGATIVE
KETONES UR STRIP-MCNC: 20 MG/DL
LEUKOCYTE ESTERASE UR QL STRIP: NEGATIVE
MCH RBC QN AUTO: 29.9 PG (ref 26.5–33)
MCHC RBC AUTO-ENTMCNC: 32.6 G/DL (ref 31.5–36.5)
MCV RBC AUTO: 92 FL (ref 78–100)
MUCOUS THREADS #/AREA URNS LPF: PRESENT /LPF
NITRATE UR QL: NEGATIVE
PH UR STRIP: 6.5 PH (ref 5–7)
PLATELET # BLD AUTO: 199 10E9/L (ref 150–450)
POTASSIUM SERPL-SCNC: 4 MMOL/L (ref 3.4–5.3)
PROT SERPL-MCNC: 8 G/DL (ref 6.8–8.8)
RBC # BLD AUTO: 5.22 10E12/L (ref 4.4–5.9)
RBC #/AREA URNS AUTO: 2 /HPF (ref 0–2)
SODIUM SERPL-SCNC: 138 MMOL/L (ref 133–144)
SOURCE: ABNORMAL
SP GR UR STRIP: 1.03 (ref 1–1.03)
SQUAMOUS #/AREA URNS AUTO: <1 /HPF (ref 0–1)
UROBILINOGEN UR STRIP-MCNC: NORMAL MG/DL (ref 0–2)
WBC # BLD AUTO: 15 10E9/L (ref 4–11)
WBC #/AREA URNS AUTO: 2 /HPF (ref 0–5)

## 2020-08-02 PROCEDURE — 96374 THER/PROPH/DIAG INJ IV PUSH: CPT | Mod: 59

## 2020-08-02 PROCEDURE — 96375 TX/PRO/DX INJ NEW DRUG ADDON: CPT

## 2020-08-02 PROCEDURE — 74177 CT ABD & PELVIS W/CONTRAST: CPT

## 2020-08-02 PROCEDURE — 80053 COMPREHEN METABOLIC PANEL: CPT | Performed by: NURSE PRACTITIONER

## 2020-08-02 PROCEDURE — 81001 URINALYSIS AUTO W/SCOPE: CPT | Performed by: NURSE PRACTITIONER

## 2020-08-02 PROCEDURE — 99285 EMERGENCY DEPT VISIT HI MDM: CPT | Mod: 25

## 2020-08-02 PROCEDURE — 25000128 H RX IP 250 OP 636: Performed by: NURSE PRACTITIONER

## 2020-08-02 PROCEDURE — 85027 COMPLETE CBC AUTOMATED: CPT | Performed by: NURSE PRACTITIONER

## 2020-08-02 PROCEDURE — 25000125 ZZHC RX 250: Performed by: NURSE PRACTITIONER

## 2020-08-02 PROCEDURE — 74176 CT ABD & PELVIS W/O CONTRAST: CPT

## 2020-08-02 RX ORDER — HYDROMORPHONE HYDROCHLORIDE 1 MG/ML
0.5 INJECTION, SOLUTION INTRAMUSCULAR; INTRAVENOUS; SUBCUTANEOUS ONCE
Status: COMPLETED | OUTPATIENT
Start: 2020-08-02 | End: 2020-08-02

## 2020-08-02 RX ORDER — ONDANSETRON 2 MG/ML
4 INJECTION INTRAMUSCULAR; INTRAVENOUS ONCE
Status: COMPLETED | OUTPATIENT
Start: 2020-08-02 | End: 2020-08-02

## 2020-08-02 RX ORDER — IOPAMIDOL 755 MG/ML
500 INJECTION, SOLUTION INTRAVASCULAR ONCE
Status: COMPLETED | OUTPATIENT
Start: 2020-08-02 | End: 2020-08-02

## 2020-08-02 RX ORDER — KETOROLAC TROMETHAMINE 15 MG/ML
15 INJECTION, SOLUTION INTRAMUSCULAR; INTRAVENOUS ONCE
Status: COMPLETED | OUTPATIENT
Start: 2020-08-02 | End: 2020-08-02

## 2020-08-02 RX ADMIN — HYDROMORPHONE HYDROCHLORIDE 0.5 MG: 1 INJECTION, SOLUTION INTRAMUSCULAR; INTRAVENOUS; SUBCUTANEOUS at 16:40

## 2020-08-02 RX ADMIN — IOPAMIDOL 100 ML: 755 INJECTION, SOLUTION INTRAVENOUS at 19:11

## 2020-08-02 RX ADMIN — ONDANSETRON 4 MG: 2 INJECTION INTRAMUSCULAR; INTRAVENOUS at 16:36

## 2020-08-02 RX ADMIN — KETOROLAC TROMETHAMINE 15 MG: 15 INJECTION, SOLUTION INTRAMUSCULAR; INTRAVENOUS at 16:40

## 2020-08-02 RX ADMIN — SODIUM CHLORIDE 65 ML: 9 INJECTION, SOLUTION INTRAVENOUS at 19:12

## 2020-08-02 ASSESSMENT — ENCOUNTER SYMPTOMS
HEMATURIA: 0
FEVER: 0
BACK PAIN: 1
NAUSEA: 1
DIFFICULTY URINATING: 0
DYSURIA: 0
VOMITING: 1
FREQUENCY: 0
ABDOMINAL PAIN: 1
CHILLS: 0
SHORTNESS OF BREATH: 0

## 2020-08-02 NOTE — ED PROVIDER NOTES
History     Chief Complaint:  Abdominal Pain      The history is provided by the patient.      Yuniel Mckeon is a 35 year old male with a personal history of abdominal and ventral hernias who presents with lower abdominal pain that began around midnight last night, waking him from sleep. The patient initially thought it was gas, but throughout the day the pain continued to worsen. He notes that after checking into the ED, he threw up in bathroom for the first time today. His nausea improved after vomiting, although still present here in ED. He additionally complains of pain radiating to his left back, though minimal. The patient presented to ED over concern for possible hernia, and reports that this pain feels similar to a protruding umbilical hernia he had repaired years ago. He denies any urinary symptoms, or a personal or family history of kidney stones.      Allergies:  No Known Drug Allergies    Medications:    The patient is not currently taking any prescribed medications.    Past Medical History:    Obesity  Abdominal hernia  Postoperative nausea and vomiting  Ventral hernia without obstruction or gangrene    Past Surgical History:    Appendectomy  Vasectomy  Ventral herniorrhaphy    Family History:    History reviewed. No pertinent family history.     Social History:  The patient was not accompanied to the ED.  Smoking Status: Never smoker  Smokeless Tobacco: Never used  Alcohol Use: Yes  Marital Status:      Review of Systems   Constitutional: Negative for chills and fever.   Respiratory: Negative for shortness of breath.    Cardiovascular: Negative for chest pain.   Gastrointestinal: Positive for abdominal pain (lower central/left abd), nausea and vomiting (1 episode in ED).   Genitourinary: Negative for decreased urine volume, difficulty urinating, dysuria, frequency, hematuria and urgency.   Musculoskeletal: Positive for back pain (minimal abd pain radiating to L back).   All other systems  reviewed and are negative.    Physical Exam     Patient Vitals for the past 24 hrs:   BP Temp Pulse Heart Rate Resp SpO2 Weight   08/02/20 1930 -- -- 87 87 -- 95 % --   08/02/20 1800 -- -- -- -- -- 94 % --   08/02/20 1730 -- -- -- -- -- 94 % --   08/02/20 1715 -- -- -- -- -- 94 % --   08/02/20 1700 -- -- -- -- -- 93 % --   08/02/20 1543 (!) 146/92 98.1  F (36.7  C) 86 -- 16 98 % 97.5 kg (215 lb)       Physical Exam  General: Alert, Mild  discomfort, well kept  Eyes: PERRL, conjunctivae pink no scleral icterus or conjunctival injection  ENT:   Moist mucus membranes, posterior oropharynx clear without erythema or exudates, No lymphadenopathy, Normal voice  Resp:  Lungs clear to auscultation bilaterally, no crackles/rubs/wheezes. Good air movement  CV:  Normal rate and rhythm, no murmurs/rubs/gallops  GI:  Abdomen soft and non-distended.  Normoactive BS. No guarding or rebound, No masses. Suprapubic and left lower abdominal tenderness.  Mild right-sided lower abdominal tenderness   Reexamination: Continued abdominal tenderness   Second reexamination: No abdominal tenderness  Skin:  Warm, dry.  No rashes or petechiae  Musculoskeletal: No peripheral edema or calf tenderness, Normal gross ROM   Neuro: Alert and oriented to person/place/time, normal sensation  Psychiatric: Normal affect, cooperative, good eye contact    Emergency Department Course     Imaging:  Radiology findings were communicated with the patient who voiced understanding of the findings.    Abd/pelvis CT no contrast - Stone Protocol:  1.  There is an unusual localized inflammatory focus involving mesenteric fat of mid to distal small bowel which is of uncertain etiology.  2.  There is atypical but could relate to mesenteric infarct or localized extra luminal inflammation. In addition, a small carcinoid tumor could have this appearance.  3.  Fluid-filled borderline dilatation of small bowel, symptoms may relate to developing mild obstruction.  4.  No  urinary tract stones or hydronephrosis.  5.  Consider follow-up exam with IV contrast to reassess this focus. If patient's symptoms resolve this follow-up exam could be performed in 4-6 weeks to exclude underlying carcinoid tumor.  Report per radiology.    CT Abdomen Pelvis w Contrast:  1.  There is no change in the small nonspecific apparent inflammatory focus involving right side of small bowel mesentery. Small carcinoid tumor can't be excluded and would still suggest follow-up exam in 6-8 weeks.  2.  There is been improvement in the appearance of bowel with resolution of the minimal obstructive pattern suggested previously.  3.  However, there is new wisp of ascites etiology uncertain, however, likely related to the focus of mesenteric inflammation.  Report per radiology.    Laboratory:  Laboratory findings were communicated with the patient who voiced understanding of the findings.    CBC: WBC 15.0 (H), HGB 15.6,   CMP: Glucose 129 (H), o/w WNL (Creatinine 0.94)    UA with micro: Ketones 20, Protein Albumin 50, Mucous present, o/w negative  Urine Culture Aerobic Bacterial: Pending    Procedures  None.    Interventions:  1636 Zofran 4 mg IV  1640 Dilaudid 0.5 mg IV  1640 Toradol 15 mg IV    Emergency Department Course:  Past medical records, nursing notes, and vitals reviewed.    1604 I performed an exam of the patient as documented above.     IV was inserted and blood was drawn for laboratory testing, results above.    The patient provided a urine sample here in the emergency department. This was sent for laboratory testing, findings above.    The patient was sent for a non-contrast CT of the abdomen and pelvis while in the emergency department, results above.     The patient was sent for a contrast CT of the abdomen and pelvis while in the emergency department, results above.    2017 I spoke with Dr. Johnston of the General surgery service regarding patient's presentation, findings, and plan of  care.    2020 I rechecked the patient and discussed the results of his workup thus far.     I discussed the treatment plan with the patient. Although admission was recommended, the patient refused to be admitted and requested discharge. They will be discharged home with instructions for care and follow up. In addition, the patient will return to the emergency department if their symptoms persist, worsen, if new symptoms arise or if there is any concern.  All questions were answered, and all possible complications were discussed with the patient.    I personally reviewed the laboratory and imaging results with the Patient and answered all related questions prior to discharge.    Impression & Plan     Medical Decision Making:  Yuniel Mckeon is a 35 year old male who presents today for evaluation of sudden onset lower abdominal discomfort.  His examination showed tenderness in the lower abdominal area therefore CT scan was obtained.  Initially the discomfort appeared to be more on the left and possibly radiated towards his kidney so noncontrast exam was done.  Inconclusive results with question of early obstruction versus mass versus inflammatory process were noted.  Question possible mesenteric ischemia also noted.  Given the unclear findings I did repeat the CT with contrast.  The pattern of possible obstruction had improved.  There is a question of mesenteric inflammation.  There remains the possibility of a tumor.  I discussed all these findings with the patient after discussing the case with the surgeon who did recommend admission for observation and surgical consult in the morning.  After discussing this with the patient he discussed with his wife and chose not to be admitted at this time.  He does understand that this can lead to serious complications including death.  He still prefers to be discharged to home.  He does state he will seek close follow-up with his primary care provider.  Strict return  protocols to emergency department were discussed.  He is discharged home.    Diagnosis:    ICD-10-CM    1. Lower abdominal pain  R10.30        Disposition:  Discharged to home.        Scribe Disclosure:  I, Emma Mathur, am serving as a scribe at 4:04 PM on 8/2/2020 to document services personally performed by Dharmesh Rivera APRN based on my observations and the provider's statements to me.     Emma Mathur  8/2/2020   [facility] EMERGENCY DEPARTMENT       Dharmesh Rivera APRN CNP  08/02/20 0912

## 2020-08-02 NOTE — ED AVS SNAPSHOT
[facility] Emergency Department  [address]  [facility] [address]  Phone:  [phone]  Fax:  [phone]                                    Yuniel Mckeon   MRN: [id number]    Department:  [facility] Emergency Department   Date of Visit:  8/2/2020           After Visit Summary Signature Page    I have received my discharge instructions, and my questions have been answered. I have discussed any challenges I see with this plan with the nurse or doctor.    ..........................................................................................................................................  Patient/Patient Representative Signature      ..........................................................................................................................................  Patient Representative Print Name and Relationship to Patient    ..................................................               ................................................  Date                                   Time    ..........................................................................................................................................  Reviewed by Signature/Title    ...................................................              ..............................................  Date                                               Time          22EPIC Rev 08/18

## 2020-08-02 NOTE — ED TRIAGE NOTES
Pt c/o pain across low abdomen that woke him from sleep starting around midnight.  Pt c/o cramping and pressure.  Feels like his umbilical hernia that he had repaired a few years ago.  No bulging of the abdomen.  Associated nausea

## 2020-08-03 ENCOUNTER — OFFICE VISIT (OUTPATIENT)
Dept: INTERNAL MEDICINE | Facility: CLINIC | Age: 35
End: 2020-08-03
Payer: COMMERCIAL

## 2020-08-03 VITALS
BODY MASS INDEX: 31.72 KG/M2 | HEIGHT: 71 IN | RESPIRATION RATE: 20 BRPM | SYSTOLIC BLOOD PRESSURE: 127 MMHG | DIASTOLIC BLOOD PRESSURE: 77 MMHG | TEMPERATURE: 98.6 F | OXYGEN SATURATION: 95 % | WEIGHT: 226.6 LBS | HEART RATE: 77 BPM

## 2020-08-03 DIAGNOSIS — R10.30 LOWER ABDOMINAL PAIN: Primary | ICD-10-CM

## 2020-08-03 DIAGNOSIS — R93.3 ABNORMAL CT SCAN, SMALL BOWEL: ICD-10-CM

## 2020-08-03 PROCEDURE — 99214 OFFICE O/P EST MOD 30 MIN: CPT | Performed by: INTERNAL MEDICINE

## 2020-08-03 ASSESSMENT — MIFFLIN-ST. JEOR: SCORE: 1974.98

## 2020-08-03 NOTE — ED NOTES
[facility]  ED Nurse Handoff Report    Yuniel Mckeon is a 35 year old male   ED Chief complaint: Abdominal Pain  . ED Diagnosis:   Final diagnoses:   Lower abdominal pain     Allergies: No Known Allergies    Code Status: Full Code  Activity level - Baseline/Home:  Independent. Activity Level - Current:   Independent. Lift room needed: No. Bariatric: No   Needed: No   Isolation: No. Infection: Not Applicable.     Vital Signs:   Vitals:    08/02/20 1715 08/02/20 1730 08/02/20 1800 08/02/20 1930   BP:       Pulse:    87   Resp:       Temp:       SpO2: 94% 94% 94% 95%   Weight:           Cardiac Rhythm:  ,      Pain level: 0-10 Pain Scale: 8  Patient confused: No. Patient Falls Risk: Yes.   Elimination Status: Has voided   Patient Report - Initial Complaint: Pt c/o pain across low abdomen that woke him from sleep starting around midnight.  Pt c/o cramping and pressure.  Feels like his umbilical hernia that he had repaired a few years ago.  No bulging of the abdomen.  Associated nausea.   Focused Assessment: Lower bilateral quadrant pain/discomfort.   Tests Performed: Labs, Imaging, UA. Abnormal Results:   Labs Ordered and Resulted from Time of ED Arrival Up to the Time of Departure from the ED   CBC WITH PLATELETS - Abnormal; Notable for the following components:       Result Value    WBC 15.0 (*)     All other components within normal limits   COMPREHENSIVE METABOLIC PANEL - Abnormal; Notable for the following components:    Glucose 129 (*)     All other components within normal limits   ROUTINE UA WITH MICROSCOPIC REFLEX TO CULTURE - Abnormal; Notable for the following components:    Ketones Urine 20 (*)     Protein Albumin Urine 50 (*)     Mucous Urine Present (*)     All other components within normal limits   MAY SALINE LOCK IV   FREE WATER     CT Abdomen Pelvis w Contrast   Final Result   IMPRESSION:    1.  There is no change in the small nonspecific apparent inflammatory focus  involving right side of small bowel mesentery. Small carcinoid tumor can't be excluded and would still suggest follow-up exam in 6-8 weeks.   2.  There is been improvement in the appearance of bowel with resolution of the minimal obstructive pattern suggested previously.   3.  However, there is new wisp of ascites etiology uncertain, however, likely related to the focus of mesenteric inflammation.      Abd/pelvis CT no contrast - Stone Protocol   Final Result   IMPRESSION:    1.  There is an unusual localized inflammatory focus involving mesenteric fat of mid to distal small bowel which is of uncertain etiology.   2.  There is atypical but could relate to mesenteric infarct or localized extra luminal inflammation. In addition, a small carcinoid tumor could have this appearance.   3.  Fluid-filled borderline dilatation of small bowel, symptoms may relate to developing mild obstruction.   4.  No urinary tract stones or hydronephrosis.   5.  Consider follow-up exam with IV contrast to reassess this focus. If patient's symptoms resolve this follow-up exam could be performed in 4-6 weeks to exclude underlying carcinoid tumor.           .   Treatments provided: Comfort positioning, Zofran, Toradol, Dilaudid.  Family Comments: No family at bedside.  OBS brochure/video discussed/provided to patient:  Yes  ED Medications:   Medications   ondansetron (ZOFRAN) injection 4 mg (4 mg Intravenous Given 8/2/20 1636)   HYDROmorphone (PF) (DILAUDID) injection 0.5 mg (0.5 mg Intravenous Given 8/2/20 1640)   ketorolac (TORADOL) injection 15 mg (15 mg Intravenous Given 8/2/20 1640)   Saline CT scan flush (65 mLs Intravenous Given 8/2/20 1912)   iopamidol (ISOVUE-370) solution 500 mL (100 mLs Intravenous Given 8/2/20 1911)     Drips infusing:  No  For the majority of the shift, the patient's behavior Green. Interventions performed were N/A.    Sepsis treatment initiated: No       ED Nurse Name/Phone Number: Darby Davila RN,   8:23  PM

## 2020-08-03 NOTE — DISCHARGE INSTRUCTIONS
You have decided against admission and understand the risks that are involved with this.  Again you can get very systemically ill and if you develop systemic illness serious complications such as death can be involved.  Immediate return to the emergency department if you develop increased pain, fevers, or other concerns.

## 2020-08-03 NOTE — PROGRESS NOTES
"Subjective     Yuniel Mckeon is a 35 year old male who presents to clinic today for the following health issues:    HPI       ED/UC Followup:    Facility:  [facility] ED  Date of visit: 08/02/2020  Reason for visit: lower Abdominal Pain  Current Status: improved     He had presented with fairly sudden onset of significant lower abdominal pain that was very steady.  He had an episode of nausea and vomiting.  Initial CT at the ED showed possibility of obstruction, some small bowel mesenteric inflammation, could not rule out a tumor.  They repeated the scan with contrast which showed the previous evidence of obstruction had cleared but the inflammation was still present and they still could not determine whether there was a tumor suggestive of carcinoid present or not.  Radiologist recommended to repeat in 6 to 8 weeks.  Was felt that it was possibly he had ischemic bowel disease.    He reports that his pain is significantly improved, there is some mild soreness present, has had no further nausea or vomiting.  He has not had any blood in his stools.  He reports no fever or chills.  He had taken a little Advil at home, has some questions regarding what he could take for pain    Patient Active Problem List   Diagnosis     Obesity (BMI 30.0-34.9)     No current outpatient medications on file.      Social History     Tobacco Use     Smoking status: Never Smoker     Smokeless tobacco: Never Used   Substance Use Topics     Alcohol use: Yes     Alcohol/week: 0.0 standard drinks     Comment: 1-2 drinks per week average     Drug use: No        Reviewed and updated as needed this visit by Provider         Review of Systems   No fever, chills, nausea, vomiting, diarrhea, blood in stool      Objective    /77 (BP Location: Left arm, Patient Position: Sitting, Cuff Size: Adult Regular)   Pulse 77   Temp 98.6  F (37  C) (Oral)   Resp 20   Ht 1.803 m (5' 11\")   Wt 102.8 kg (226 lb 9.6 oz)   SpO2 95%   BMI " 31.60 kg/m    Body mass index is 31.6 kg/m .  Physical Exam       Abdomen: Bowel sounds are present, soft, mild tenderness across the lower abdomen without rebound, guarding or masses        Assessment & Plan     1. Lower abdominal pain  Likely the pain is related to the mesenteric inflammation, could be ischemic bowel given the sudden onset of pain.  He does not seem to have any other risk factors for clotting and the scan did not show evidence of blood clots.  Symptoms are improving so would not recommend any other treatment, suggest just Tylenol for pain as NSAIDs could potentially cause more bleeding, advised if this had been an ischemic episode, you could pass a little dark stool but if you were to have significant increase in pain as severe as it was, fever, other symptoms that are severe, he may need to return to the ED. if not quite as severe, call might consider repeating the CAT scan earlier    2. Abnormal CT scan, small bowel  Advised that most likely this represented some type of inflammation causing pain, suggest we do schedule the CT is recommended in 6 to 8 weeks for follow-up.  If still abnormal may consider referral to surgery  - CT Abdomen Pelvis w Contrast; Future         Return in about 6 months (around 2/3/2021) for Physical Exam with primary.    Millicent Handley MD  [facility]

## 2020-09-23 DIAGNOSIS — Z11.59 ENCOUNTER FOR SCREENING FOR OTHER VIRAL DISEASES: Primary | ICD-10-CM

## 2020-09-23 DIAGNOSIS — Z30.2 ENCOUNTER FOR STERILIZATION: Primary | ICD-10-CM

## 2020-09-23 RX ORDER — CEFAZOLIN SODIUM 1 G
1 VIAL (EA) INJECTION SEE ADMIN INSTRUCTIONS
Status: CANCELLED | OUTPATIENT
Start: 2020-09-23

## 2020-11-09 ENCOUNTER — OFFICE VISIT (OUTPATIENT)
Dept: FAMILY MEDICINE | Facility: CLINIC | Age: 35
End: 2020-11-09
Payer: COMMERCIAL

## 2020-11-09 VITALS
OXYGEN SATURATION: 96 % | SYSTOLIC BLOOD PRESSURE: 124 MMHG | BODY MASS INDEX: 32.76 KG/M2 | HEIGHT: 71 IN | DIASTOLIC BLOOD PRESSURE: 82 MMHG | TEMPERATURE: 98.2 F | HEART RATE: 59 BPM | WEIGHT: 234 LBS

## 2020-11-09 DIAGNOSIS — Z30.2 ENCOUNTER FOR STERILIZATION: ICD-10-CM

## 2020-11-09 DIAGNOSIS — Z23 NEED FOR PROPHYLACTIC VACCINATION AND INOCULATION AGAINST INFLUENZA: ICD-10-CM

## 2020-11-09 DIAGNOSIS — Z01.818 PREOP GENERAL PHYSICAL EXAM: Primary | ICD-10-CM

## 2020-11-09 DIAGNOSIS — E66.811 OBESITY (BMI 30.0-34.9): ICD-10-CM

## 2020-11-09 PROCEDURE — 90471 IMMUNIZATION ADMIN: CPT | Performed by: PHYSICIAN ASSISTANT

## 2020-11-09 PROCEDURE — 90686 IIV4 VACC NO PRSV 0.5 ML IM: CPT | Performed by: PHYSICIAN ASSISTANT

## 2020-11-09 PROCEDURE — 99214 OFFICE O/P EST MOD 30 MIN: CPT | Mod: 25 | Performed by: PHYSICIAN ASSISTANT

## 2020-11-09 ASSESSMENT — MIFFLIN-ST. JEOR: SCORE: 2008.55

## 2020-11-09 NOTE — PROGRESS NOTES
[facility]  [address] [facility] [facility] [address]  Phone: [phone]  Fax: [phone]  Primary Provider: Akshat Tracy  Pre-op Performing Provider: AKSHAT TRACY    PREOPERATIVE EVALUATION:  Today's date: 11/9/2020    Yuniel Mckeon is a 35 year old male who presents for a preoperative evaluation.    Surgical Information:  Surgery/Procedure: Left sided vasectomy  Surgery Location: [facility]   Surgeon:Toribio Bruno MD   Surgery Date: 11/13/20  Time of Surgery: 12:10 pm  Where patient plans to recover: At home with family  Fax number for surgical facility: Note does not need to be faxed, will be available electronically in Epic.    Type of Anesthesia Anticipated: Monitor Anesthesia Care    Subjective     HPI related to upcoming procedure: Yuniel is a 36 yo male here today for pre-op. He underwent vasectomy almost 3 yrs ago, but recent semen analysis showed that he still had sperm present. Thus, now pursuing repeat vasectomy as this suggests he still has a small L vas deferens even though this was not found on prior vasectomy. Evaluated by urology who advised scrotal exploration in the operating room. second pre-op as previous wound up being cancelled due to COVID19 pandemic.    Preop Questions 11/9/2020   1. Have you ever had a heart attack or stroke? No   2. Have you ever had surgery on your heart or blood vessels, such as a stent placement, a coronary artery bypass, or surgery on an artery in your head, neck, heart, or legs? No   3. Do you have chest pain with activity? No   4. Do you have a history of  heart failure? No   5. Do you currently have a cold, bronchitis or symptoms of other infection? No   6. Do you have a cough, shortness of breath, or wheezing? No   7. Do you or anyone in your family have previous history of blood clots? No   8. Do you or does anyone in your family have a serious bleeding problem such as prolonged bleeding following  surgeries or cuts? No   9. Have you ever had problems with anemia or been told to take iron pills? No   10. Have you had any abnormal blood loss such as black, tarry or bloody stools? No   11. Have you ever had a blood transfusion? No   12. Are you willing to have a blood transfusion if it is medically needed before, during, or after your surgery? Yes   13. Have you or any of your relatives ever had problems with anesthesia? No   14. Do you have sleep apnea, excessive snoring or daytime drowsiness? No   15. Do you have any artifical heart valves or other implanted medical devices like a pacemaker, defibrillator, or continuous glucose monitor? No   16. Do you have artificial joints? No   17. Are you allergic to latex? No       Health Care Directive:  Patient does not have a Health Care Directive or Living Will: Patient states has Advance Directive and will bring in a copy to clinic.    Preoperative Review of : not done        Review of Systems  CONSTITUTIONAL: NEGATIVE for fever, chills, change in weight  ENT/MOUTH: NEGATIVE for ear, mouth and throat problems  RESP: NEGATIVE for significant cough or SOB  CV: NEGATIVE for chest pain, palpitations or peripheral edema    Patient Active Problem List    Diagnosis Date Noted     Encounter for sterilization 09/23/2020     Priority: Medium     Added automatically from request for surgery [id number]       Obesity (BMI 30.0-34.9) 10/31/2016     Priority: Medium      Past Medical History:   Diagnosis Date     Hernia, abdominal      PONV (postoperative nausea and vomiting)     age 18     Ventral hernia without obstruction or gangrene 10/31/2016     Past Surgical History:   Procedure Laterality Date     APPENDECTOMY OPEN  2002     GENITOURINARY SURGERY      vasectomy     HERNIORRHAPHY VENTRAL N/A 11/10/2016    Procedure: HERNIORRHAPHY VENTRAL;  Surgeon: Matt Johnston MD;  Location:  OR     No current outpatient medications on file.       No Known Allergies     Social History  "    Tobacco Use     Smoking status: Never Smoker     Smokeless tobacco: Never Used   Substance Use Topics     Alcohol use: Yes     Alcohol/week: 0.0 standard drinks     Comment: 1-2 drinks per week average       History   Drug Use No         Objective     /82   Pulse 59   Temp 98.2  F (36.8  C) (Tympanic)   Ht 1.803 m (5' 11\")   Wt 106.1 kg (234 lb)   SpO2 96%   BMI 32.64 kg/m      Physical Exam  GENERAL APPEARANCE: healthy, alert and no distress  HENT: ear canals and TM's normal and nose and mouth without ulcers or lesions  RESP: lungs clear to auscultation - no rales, rhonchi or wheezes  CV: regular rate and rhythm, normal S1 S2, no S3 or S4 and no murmur, click or rub   ABDOMEN: soft, nontender, no HSM or masses and bowel sounds normal  NEURO: Normal strength and tone, sensory exam grossly normal, mentation intact and speech normal    Recent Labs   Lab Test 08/02/20  1647   HGB 15.6         POTASSIUM 4.0   CR 0.94        Diagnostics:  No labs were ordered during this visit.   No EKG required for low risk surgery (cataract, skin procedure, breast biopsy, etc).    Revised Cardiac Risk Index (RCRI):  The patient has the following serious cardiovascular risks for perioperative complications:   - No serious cardiac risks = 0 points     RCRI Interpretation: 0 points: Class I (very low risk - 0.4% complication rate)           Assessment & Plan   The proposed surgical procedure is considered LOW risk.      ICD-10-CM    1. Preop general physical exam  Z01.818    2. Encounter for sterilization  Z30.2    3. Obesity (BMI 30.0-34.9)  E66.9    4. Need for prophylactic vaccination and inoculation against influenza  Z23 INFLUENZA VACCINE IM > 6 MONTHS VALENT IIV4 [70729]        Risks and Recommendations:  The patient has the following additional risks and recommendations for perioperative complications:   - No identified additional risk factors other than previously addressed    Medication " Instructions:  Patient is on no chronic medications    RECOMMENDATION:  APPROVAL GIVEN to proceed with proposed procedure, without further diagnostic evaluation.    Signed Electronically by: Christine Tracy PA-C    Copy of this evaluation report is provided to requesting physician.    [facility]    Revised Cardiac Risk Index

## 2020-11-10 DIAGNOSIS — Z11.59 ENCOUNTER FOR SCREENING FOR OTHER VIRAL DISEASES: ICD-10-CM

## 2020-11-10 PROCEDURE — U0003 INFECTIOUS AGENT DETECTION BY NUCLEIC ACID (DNA OR RNA); SEVERE ACUTE RESPIRATORY SYNDROME CORONAVIRUS 2 (SARS-COV-2) (CORONAVIRUS DISEASE [COVID-19]), AMPLIFIED PROBE TECHNIQUE, MAKING USE OF HIGH THROUGHPUT TECHNOLOGIES AS DESCRIBED BY CMS-2020-01-R: HCPCS | Mod: 90 | Performed by: PATHOLOGY

## 2020-11-10 PROCEDURE — 99000 SPECIMEN HANDLING OFFICE-LAB: CPT | Performed by: PATHOLOGY

## 2020-11-11 LAB
SARS-COV-2 RNA SPEC QL NAA+PROBE: NOT DETECTED
SPECIMEN SOURCE: NORMAL

## 2020-11-13 ENCOUNTER — ANESTHESIA (OUTPATIENT)
Dept: SURGERY | Facility: CLINIC | Age: 35
End: 2020-11-13
Payer: COMMERCIAL

## 2020-11-13 ENCOUNTER — HOSPITAL ENCOUNTER (OUTPATIENT)
Facility: CLINIC | Age: 35
Discharge: HOME | End: 2020-11-13
Attending: UROLOGY | Admitting: UROLOGY
Payer: COMMERCIAL

## 2020-11-13 ENCOUNTER — ANESTHESIA EVENT (OUTPATIENT)
Dept: SURGERY | Facility: CLINIC | Age: 35
End: 2020-11-13
Payer: COMMERCIAL

## 2020-11-13 VITALS
OXYGEN SATURATION: 98 % | TEMPERATURE: 97.2 F | RESPIRATION RATE: 16 BRPM | SYSTOLIC BLOOD PRESSURE: 118 MMHG | DIASTOLIC BLOOD PRESSURE: 65 MMHG

## 2020-11-13 DIAGNOSIS — Z30.2 ENCOUNTER FOR STERILIZATION: ICD-10-CM

## 2020-11-13 PROCEDURE — 360N000014 HC SURGERY LEVEL 2 1ST 30 MIN: Performed by: UROLOGY

## 2020-11-13 PROCEDURE — 250N000011 HC RX IP 250 OP 636: Performed by: NURSE ANESTHETIST, CERTIFIED REGISTERED

## 2020-11-13 PROCEDURE — 370N000001 HC ANESTHESIA TECHNICAL FEE, 1ST 30 MIN: Performed by: UROLOGY

## 2020-11-13 PROCEDURE — 88302 TISSUE EXAM BY PATHOLOGIST: CPT | Mod: 26 | Performed by: PATHOLOGY

## 2020-11-13 PROCEDURE — 761N000007 HC RECOVERY PHASE 2 EACH 15 MINS: Performed by: UROLOGY

## 2020-11-13 PROCEDURE — 88304 TISSUE EXAM BY PATHOLOGIST: CPT | Mod: TC | Performed by: UROLOGY

## 2020-11-13 PROCEDURE — 272N000001 HC OR GENERAL SUPPLY STERILE: Performed by: UROLOGY

## 2020-11-13 PROCEDURE — 999N000136 HC STATISTIC PRE PROC ASSESS II: Performed by: UROLOGY

## 2020-11-13 PROCEDURE — 250N000011 HC RX IP 250 OP 636: Performed by: UROLOGY

## 2020-11-13 PROCEDURE — 258N000003 HC RX IP 258 OP 636: Performed by: NURSE ANESTHETIST, CERTIFIED REGISTERED

## 2020-11-13 PROCEDURE — 250N000009 HC RX 250: Performed by: NURSE ANESTHETIST, CERTIFIED REGISTERED

## 2020-11-13 PROCEDURE — 370N000002 HC ANESTHESIA TECHNICAL FEE, EACH ADDTL 15 MIN: Performed by: UROLOGY

## 2020-11-13 PROCEDURE — 250N000009 HC RX 250: Performed by: UROLOGY

## 2020-11-13 PROCEDURE — 360N000015 HC SURGERY LEVEL 2 EA 15 ADDTL MIN: Performed by: UROLOGY

## 2020-11-13 PROCEDURE — 55250 REMOVAL OF SPERM DUCT(S): CPT | Performed by: UROLOGY

## 2020-11-13 RX ORDER — HYDRALAZINE HYDROCHLORIDE 20 MG/ML
2.5-5 INJECTION INTRAMUSCULAR; INTRAVENOUS EVERY 10 MIN PRN
Status: DISCONTINUED | OUTPATIENT
Start: 2020-11-13 | End: 2020-11-13 | Stop reason: HOSPADM

## 2020-11-13 RX ORDER — CEFAZOLIN SODIUM 2 G/100ML
2 INJECTION, SOLUTION INTRAVENOUS
Status: COMPLETED | OUTPATIENT
Start: 2020-11-13 | End: 2020-11-13

## 2020-11-13 RX ORDER — SODIUM CHLORIDE, SODIUM LACTATE, POTASSIUM CHLORIDE, CALCIUM CHLORIDE 600; 310; 30; 20 MG/100ML; MG/100ML; MG/100ML; MG/100ML
INJECTION, SOLUTION INTRAVENOUS CONTINUOUS PRN
Status: DISCONTINUED | OUTPATIENT
Start: 2020-11-13 | End: 2020-11-13

## 2020-11-13 RX ORDER — ONDANSETRON 2 MG/ML
INJECTION INTRAMUSCULAR; INTRAVENOUS PRN
Status: DISCONTINUED | OUTPATIENT
Start: 2020-11-13 | End: 2020-11-13

## 2020-11-13 RX ORDER — LIDOCAINE HYDROCHLORIDE 10 MG/ML
INJECTION, SOLUTION INFILTRATION; PERINEURAL PRN
Status: DISCONTINUED | OUTPATIENT
Start: 2020-11-13 | End: 2020-11-13

## 2020-11-13 RX ORDER — BACITRACIN ZINC 500 [USP'U]/G
OINTMENT TOPICAL PRN
Status: DISCONTINUED | OUTPATIENT
Start: 2020-11-13 | End: 2020-11-13 | Stop reason: HOSPADM

## 2020-11-13 RX ORDER — ONDANSETRON 2 MG/ML
4 INJECTION INTRAMUSCULAR; INTRAVENOUS EVERY 30 MIN PRN
Status: DISCONTINUED | OUTPATIENT
Start: 2020-11-13 | End: 2020-11-13 | Stop reason: HOSPADM

## 2020-11-13 RX ORDER — PROPOFOL 10 MG/ML
INJECTION, EMULSION INTRAVENOUS PRN
Status: DISCONTINUED | OUTPATIENT
Start: 2020-11-13 | End: 2020-11-13

## 2020-11-13 RX ORDER — MEPERIDINE HYDROCHLORIDE 25 MG/ML
12.5 INJECTION INTRAMUSCULAR; INTRAVENOUS; SUBCUTANEOUS
Status: DISCONTINUED | OUTPATIENT
Start: 2020-11-13 | End: 2020-11-13 | Stop reason: HOSPADM

## 2020-11-13 RX ORDER — SODIUM CHLORIDE, SODIUM LACTATE, POTASSIUM CHLORIDE, CALCIUM CHLORIDE 600; 310; 30; 20 MG/100ML; MG/100ML; MG/100ML; MG/100ML
INJECTION, SOLUTION INTRAVENOUS CONTINUOUS
Status: DISCONTINUED | OUTPATIENT
Start: 2020-11-13 | End: 2020-11-13 | Stop reason: HOSPADM

## 2020-11-13 RX ORDER — GLYCOPYRROLATE 0.2 MG/ML
INJECTION, SOLUTION INTRAMUSCULAR; INTRAVENOUS PRN
Status: DISCONTINUED | OUTPATIENT
Start: 2020-11-13 | End: 2020-11-13

## 2020-11-13 RX ORDER — ONDANSETRON 4 MG/1
4 TABLET, ORALLY DISINTEGRATING ORAL EVERY 30 MIN PRN
Status: DISCONTINUED | OUTPATIENT
Start: 2020-11-13 | End: 2020-11-13 | Stop reason: HOSPADM

## 2020-11-13 RX ORDER — DIMENHYDRINATE 50 MG/ML
25 INJECTION, SOLUTION INTRAMUSCULAR; INTRAVENOUS
Status: DISCONTINUED | OUTPATIENT
Start: 2020-11-13 | End: 2020-11-13 | Stop reason: HOSPADM

## 2020-11-13 RX ORDER — LABETALOL HYDROCHLORIDE 5 MG/ML
10 INJECTION, SOLUTION INTRAVENOUS
Status: DISCONTINUED | OUTPATIENT
Start: 2020-11-13 | End: 2020-11-13 | Stop reason: HOSPADM

## 2020-11-13 RX ORDER — FENTANYL CITRATE 50 UG/ML
INJECTION, SOLUTION INTRAMUSCULAR; INTRAVENOUS PRN
Status: DISCONTINUED | OUTPATIENT
Start: 2020-11-13 | End: 2020-11-13

## 2020-11-13 RX ORDER — DEXAMETHASONE SODIUM PHOSPHATE 4 MG/ML
INJECTION, SOLUTION INTRA-ARTICULAR; INTRALESIONAL; INTRAMUSCULAR; INTRAVENOUS; SOFT TISSUE PRN
Status: DISCONTINUED | OUTPATIENT
Start: 2020-11-13 | End: 2020-11-13

## 2020-11-13 RX ORDER — HYDROMORPHONE HYDROCHLORIDE 1 MG/ML
.3-.5 INJECTION, SOLUTION INTRAMUSCULAR; INTRAVENOUS; SUBCUTANEOUS EVERY 10 MIN PRN
Status: DISCONTINUED | OUTPATIENT
Start: 2020-11-13 | End: 2020-11-13 | Stop reason: HOSPADM

## 2020-11-13 RX ORDER — NALOXONE HYDROCHLORIDE 0.4 MG/ML
.1-.4 INJECTION, SOLUTION INTRAMUSCULAR; INTRAVENOUS; SUBCUTANEOUS
Status: DISCONTINUED | OUTPATIENT
Start: 2020-11-13 | End: 2020-11-13 | Stop reason: HOSPADM

## 2020-11-13 RX ORDER — CEFAZOLIN SODIUM 1 G/3ML
1 INJECTION, POWDER, FOR SOLUTION INTRAMUSCULAR; INTRAVENOUS SEE ADMIN INSTRUCTIONS
Status: DISCONTINUED | OUTPATIENT
Start: 2020-11-13 | End: 2020-11-13 | Stop reason: HOSPADM

## 2020-11-13 RX ORDER — FENTANYL CITRATE 50 UG/ML
25-50 INJECTION, SOLUTION INTRAMUSCULAR; INTRAVENOUS
Status: DISCONTINUED | OUTPATIENT
Start: 2020-11-13 | End: 2020-11-13 | Stop reason: HOSPADM

## 2020-11-13 RX ORDER — PROPOFOL 10 MG/ML
INJECTION, EMULSION INTRAVENOUS CONTINUOUS PRN
Status: DISCONTINUED | OUTPATIENT
Start: 2020-11-13 | End: 2020-11-13

## 2020-11-13 RX ADMIN — LIDOCAINE HYDROCHLORIDE 30 MG: 10 INJECTION, SOLUTION INFILTRATION; PERINEURAL at 12:23

## 2020-11-13 RX ADMIN — FENTANYL CITRATE 50 MCG: 50 INJECTION, SOLUTION INTRAMUSCULAR; INTRAVENOUS at 12:18

## 2020-11-13 RX ADMIN — PROPOFOL 50 MG: 10 INJECTION, EMULSION INTRAVENOUS at 12:23

## 2020-11-13 RX ADMIN — GLYCOPYRROLATE 0.1 MG: 0.2 INJECTION, SOLUTION INTRAMUSCULAR; INTRAVENOUS at 12:25

## 2020-11-13 RX ADMIN — MIDAZOLAM 2 MG: 1 INJECTION INTRAMUSCULAR; INTRAVENOUS at 12:18

## 2020-11-13 RX ADMIN — PROPOFOL 120 MCG/KG/MIN: 10 INJECTION, EMULSION INTRAVENOUS at 12:23

## 2020-11-13 RX ADMIN — FENTANYL CITRATE 25 MCG: 50 INJECTION, SOLUTION INTRAMUSCULAR; INTRAVENOUS at 12:24

## 2020-11-13 RX ADMIN — SODIUM CHLORIDE, POTASSIUM CHLORIDE, SODIUM LACTATE AND CALCIUM CHLORIDE: 600; 310; 30; 20 INJECTION, SOLUTION INTRAVENOUS at 12:18

## 2020-11-13 RX ADMIN — FENTANYL CITRATE 25 MCG: 50 INJECTION, SOLUTION INTRAMUSCULAR; INTRAVENOUS at 12:26

## 2020-11-13 RX ADMIN — CEFAZOLIN SODIUM 2 G: 2 INJECTION, SOLUTION INTRAVENOUS at 12:18

## 2020-11-13 RX ADMIN — DEXAMETHASONE SODIUM PHOSPHATE 4 MG: 4 INJECTION, SOLUTION INTRA-ARTICULAR; INTRALESIONAL; INTRAMUSCULAR; INTRAVENOUS; SOFT TISSUE at 12:25

## 2020-11-13 RX ADMIN — ONDANSETRON HYDROCHLORIDE 4 MG: 2 INJECTION, SOLUTION INTRAVENOUS at 12:27

## 2020-11-13 ASSESSMENT — ENCOUNTER SYMPTOMS
STRIDOR: 0
DYSRHYTHMIAS: 0
SEIZURES: 0

## 2020-11-13 ASSESSMENT — COPD QUESTIONNAIRES: COPD: 0

## 2020-11-13 ASSESSMENT — LIFESTYLE VARIABLES: TOBACCO_USE: 0

## 2020-11-13 NOTE — PROGRESS NOTES
Discharge instructions reviewed with patient's wife, Tarah, via phone.  AVS printed, reviewed, and given to patient to take home.

## 2020-11-13 NOTE — OP NOTE
Procedure Date: 11/13/2020      SURGEON:  Toribio Bruno MD      PREOPERATIVE DIAGNOSIS:  Request for sterilization.      POSTOPERATIVE DIAGNOSIS:  Request for sterilization.      PROCEDURE PERFORMED:  Left-sided vasectomy.      ANESTHESIA:  General with local.      COMPLICATIONS:  None.      INDICATIONS FOR PROCEDURE:  Yuniel Mckeon is a 35-year-old gentleman who previously underwent a vasectomy in the office; however, on vasectomy, I was not able to identify the left-sided vas deferens.  A normal right-sided vasectomy was performed at that time, but I could not identify the vas on the left side.  He now presents to the operating room for completion of his vasectomy with the left-sided vasectomy.      DETAILS OF THE PROCEDURE:  The risks and benefits of the procedure were explained in detail to patient and informed consent was obtained.  The patient was brought to the operating room, placed supine on the table where he underwent general endotracheal anesthetic.  The scrotum was then prepped and draped in standard sterile fashion.      After appropriate timeout, I examined the patient.  On the right side, I could feel normal scarring of the right-sided vas deferens after his right-sided vasectomy.  I then palpated the left-sided vas deferens without any difficulty.  I brought it up to the skin level and instilled 2% lidocaine local anesthetic.  I used a sharp hemostat to make an incision and isolate the vas.  I grasped the vas with a specialized Rutledge.  I cleared the vas from its surrounding tissue.  I then clamped the vas and removed a 1 cm segment, sending it for permanent pathology.  I cauterized the open ends of the vas deferens and then ligated each end with a 3-0 chromic suture.  Once proper hemostasis was ensured, I released the vas back in the left hemiscrotum.        I closed the incision with a single 3-0 chromic suture.  I placed bacitracin over the incision and covered it with fluff gauze as well as  a jockstrap.  The procedure was concluded at this point.      The patient tolerated the procedure without complications.  He went to the post-anesthetic care in good condition.  He will go home from there.  He will remove his jockstrap when he gets home.  He will return to clinic in 3 months for post-vasectomy semen analysis.         DANIELE FRANKS MD             D: 2020   T: 2020   MT: ENRIQUE      Name:     ASHLEY ARGUELLO   MRN:      [id number]        Account:        [id number]   :      1985           Procedure Date: 2020      Document: [id number]

## 2020-11-13 NOTE — ANESTHESIA PREPROCEDURE EVALUATION
Anesthesia Pre-Procedure Evaluation    Patient: Yuniel Mckeon   MRN: [id number] : 1985          Preoperative Diagnosis: Encounter for sterilization [Z30.2]    Procedure(s):  Left sided vasectomy    Past Medical History:   Diagnosis Date     Hernia, abdominal      PONV (postoperative nausea and vomiting)     age 18     Ventral hernia without obstruction or gangrene 10/31/2016     Past Surgical History:   Procedure Laterality Date     APPENDECTOMY OPEN       GENITOURINARY SURGERY      vasectomy     HERNIORRHAPHY VENTRAL N/A 11/10/2016    Procedure: HERNIORRHAPHY VENTRAL;  Surgeon: Matt Johnston MD;  Location: [facility] OR     Anesthesia Evaluation     . Pt has had prior anesthetic. Type: General    History of anesthetic complications   - PONV        ROS/MED HX    ENT/Pulmonary:  - neg pulmonary ROS    (-) tobacco use, asthma, COPD, MINA risk factors and recent URI   Neurologic:  - neg neurologic ROS    (-) seizures and CVA   Cardiovascular:  - neg cardiovascular ROS      (-) hypertension, CAD, arrhythmias and valvular problems/murmurs   METS/Exercise Tolerance:     Hematologic: Comments: Lab Test        20                       1647          WBC          15.0*         HGB          15.6          MCV          92            PLT          199            Lab Test        20                       1647          0914          NA           138           --           POTASSIUM    4.0           --           CHLORIDE     106           --           CO2          26            --           BUN          13            --           CR           0.94          --           ANIONGAP     6             --           CATHERINE          8.8           --           GLC          129*         98           - neg hematologic  ROS       Musculoskeletal:  - neg musculoskeletal ROS       GI/Hepatic:  - neg GI/hepatic ROS      (-) GERD   Renal/Genitourinary:  - ROS Renal section negative       Endo:     (+) Obesity, .   (-)  "Type I DM, Type II DM, thyroid disease and chronic steroid usage   Psychiatric:  - neg psychiatric ROS       Infectious Disease:  - neg infectious disease ROS       Malignancy:      - no malignancy   Other:    - neg other ROS                      Physical Exam  Normal systems: cardiovascular, pulmonary and dental    Airway   Mallampati: I  TM distance: >3 FB  Neck ROM: full    Dental     Cardiovascular   Rhythm and rate: regular and normal  (-) no friction rub, no systolic click and no murmur    Pulmonary    breath sounds clear to auscultation(-) no rhonchi, no decreased breath sounds, no wheezes, no rales and no stridor            Lab Results   Component Value Date    WBC 15.0 (H) 08/02/2020    HGB 15.6 08/02/2020    HCT 47.8 08/02/2020     08/02/2020     08/02/2020    POTASSIUM 4.0 08/02/2020    CHLORIDE 106 08/02/2020    CO2 26 08/02/2020    BUN 13 08/02/2020    CR 0.94 08/02/2020     (H) 08/02/2020    CATHERINE 8.8 08/02/2020    ALBUMIN 4.1 08/02/2020    PROTTOTAL 8.0 08/02/2020    ALT 31 08/02/2020    AST 29 08/02/2020    ALKPHOS 105 08/02/2020    BILITOTAL 0.8 08/02/2020       Preop Vitals  BP Readings from Last 3 Encounters:   11/09/20 124/82   08/03/20 127/77   08/02/20 (!) 146/92    Pulse Readings from Last 3 Encounters:   11/09/20 59   08/03/20 77   08/02/20 87      Resp Readings from Last 3 Encounters:   08/03/20 20   08/02/20 16   11/10/16 9    SpO2 Readings from Last 3 Encounters:   11/09/20 96%   08/03/20 95%   08/02/20 95%      Temp Readings from Last 1 Encounters:   11/09/20 98.2  F (36.8  C) (Tympanic)    Ht Readings from Last 1 Encounters:   11/09/20 1.803 m (5' 11\")      Wt Readings from Last 1 Encounters:   11/09/20 106.1 kg (234 lb)    Estimated body mass index is 32.64 kg/m  as calculated from the following:    Height as of 11/9/20: 1.803 m (5' 11\").    Weight as of 11/9/20: 106.1 kg (234 lb).       Anesthesia Plan      History & Physical Review  History and physical reviewed and " following examination; no interval change.    ASA Status:  1 .    NPO Status:  > 8 hours    Plan for MAC Reason for MAC:  Deep or markedly invasive procedure (G8)           Postoperative Care  Postoperative pain management:  IV analgesics.      Consents  Anesthetic plan, risks, benefits and alternatives discussed with:  Patient or representative and Patient..                 Dmitry Noriega MD                    .

## 2020-11-13 NOTE — ANESTHESIA CARE TRANSFER NOTE
Patient: Yuniel Mckeon    Procedure(s):  Left sided vasectomy    Diagnosis: Encounter for sterilization [Z30.2]  Diagnosis Additional Information: No value filed.    Anesthesia Type:   MAC     Note:  Airway :Room Air  Patient transferred to:Phase II  Handoff Report: Identifed the Patient, Identified the Reponsible Provider, Reviewed the pertinent medical history, Discussed the surgical course, Reviewed Intra-OP anesthesia mangement and issues during anesthesia, Set expectations for post-procedure period and Allowed opportunity for questions and acknowledgement of understanding      Vitals: (Last set prior to Anesthesia Care Transfer)    CRNA VITALS  11/13/2020 1220 - 11/13/2020 1257      11/13/2020             Pulse:  74    SpO2:  98 %                Electronically Signed By: Dean Dennis Severson, APRN CRNA  November 13, 2020  12:57 PM

## 2020-11-13 NOTE — DISCHARGE INSTRUCTIONS
GENERAL ANESTHESIA OR SEDATION ADULT DISCHARGE INSTRUCTIONS   SPECIAL PRECAUTIONS FOR 24 HOURS AFTER SURGERY    IT IS NOT UNUSUAL TO FEEL LIGHT-HEADED OR FAINT, UP TO 24 HOURS AFTER SURGERY OR WHILE TAKING PAIN MEDICATION.  IF YOU HAVE THESE SYMPTOMS; SIT FOR A FEW MINUTES BEFORE STANDING AND HAVE SOMEONE ASSIST YOU WHEN YOU GET UP TO WALK OR USE THE BATHROOM.    YOU SHOULD REST AND RELAX FOR THE NEXT 24 HOURS AND YOU MUST MAKE ARRANGEMENTS TO HAVE SOMEONE STAY WITH YOU FOR AT LEAST 24 HOURS AFTER YOUR DISCHARGE.  AVOID HAZARDOUS AND STRENUOUS ACTIVITIES.  DO NOT MAKE IMPORTANT DECISIONS FOR 24 HOURS.    DO NOT DRIVE ANY VEHICLE OR OPERATE MECHANICAL EQUIPMENT FOR 24 HOURS FOLLOWING THE END OF YOUR SURGERY.  EVEN THOUGH YOU MAY FEEL NORMAL, YOUR REACTIONS MAY BE AFFECTED BY THE MEDICATION YOU HAVE RECEIVED.    DO NOT DRINK ALCOHOLIC BEVERAGES FOR 24 HOURS FOLLOWING YOUR SURGERY.    DRINK CLEAR LIQUIDS (APPLE JUICE, GINGER ALE, 7-UP, BROTH, ETC.).  PROGRESS TO YOUR REGULAR DIET AS YOU FEEL ABLE.    YOU MAY HAVE A DRY MOUTH, A SORE THROAT, MUSCLES ACHES OR TROUBLE SLEEPING.  THESE SHOULD GO AWAY AFTER 24 HOURS.    CALL YOUR DOCTOR FOR ANY OF THE FOLLOWING:  SIGNS OF INFECTION (FEVER, GROWING TENDERNESS AT THE SURGERY SITE, A LARGE AMOUNT OF DRAINAGE OR BLEEDING, SEVERE PAIN, FOUL-SMELLING DRAINAGE, REDNESS OR SWELLING.    IT HAS BEEN OVER 8 TO 10 HOURS SINCE SURGERY AND YOU ARE STILL NOT ABLE TO URINATE (PASS WATER).     VASECTOMY DISCHARGE INSTRUCTIONS  [facility]  [address]  [phone]    Care of Incision:  Use an ice pack on your scrotum (area of surgery) hourly for 15 minutes for the rest of the day.  No showers or baths for 24 hours.  You may have drainage from your incision for 3 days.    Activity:  Do not drive today.  Resume your normal diet.  Drink 8 to 10 glasses of fluid for 2 days.  Avoid alcohol.  No sex or heavy activity for 10 days.  Take acetaminophen (Tylenol) for  pain.  Do not take aspirin or pills with ibuprofen (Advil, Nuprin) for 7 to 10 days.  Use birth control until your doctor says your sperm count is zero.    Call Your Doctor If:  You have drainage that lasts more than 3 days.  You have bleeding that is bright red.  Your condition is not improving.  You have questions.    See Your Doctor Right Away If You Have Any Signs of Infection Such As:  Redness, swelling, red streaks  The site feels hot  Discharge that smells bad  Fever above 101 degrees    Set up a follow-up visit with your doctor.

## 2020-11-13 NOTE — ANESTHESIA POSTPROCEDURE EVALUATION
Patient: Yuniel Mckeon    Procedure(s):  Left sided vasectomy    Diagnosis:Encounter for sterilization [Z30.2]  Diagnosis Additional Information: No value filed.    Anesthesia Type:  MAC    Note:  Anesthesia Post Evaluation    Patient location during evaluation: PACU  Patient participation: Able to fully participate in evaluation  Level of consciousness: awake  Pain management: adequate  Airway patency: patent  Cardiovascular status: acceptable  Respiratory status: acceptable  Hydration status: acceptable  PONV: controlled     Anesthetic complications: None          Last vitals:  Vitals:    11/13/20 1345 11/13/20 1400 11/13/20 1415   BP: 115/79 122/67 118/65   Resp:   16   Temp:   97.2  F (36.2  C)   SpO2:   98%         Electronically Signed By: Dmitry Noriega MD  November 13, 2020  2:53 PM

## 2020-11-16 LAB — COPATH REPORT: NORMAL

## 2021-02-25 DIAGNOSIS — Z30.2 ENCOUNTER FOR STERILIZATION: Primary | ICD-10-CM

## 2021-03-01 DIAGNOSIS — Z30.2 ENCOUNTER FOR STERILIZATION: ICD-10-CM

## 2021-03-01 LAB — SEMEN ANALYSIS P VAS PNL: NORMAL

## 2021-03-01 PROCEDURE — 89321 SEMEN ANAL SPERM DETECTION: CPT | Performed by: UROLOGY

## 2021-03-08 ENCOUNTER — TELEPHONE (OUTPATIENT)
Dept: UROLOGY | Facility: CLINIC | Age: 36
End: 2021-03-08

## 2021-03-08 NOTE — TELEPHONE ENCOUNTER
Called Yuniel with results as below and left message on identified voice mail. Requested he call back, if any questions.  TIMI Reed RN  ______________________________________________________    ----- Message from Toribio Bruno MD sent at 3/7/2021  4:19 PM CST -----  Please inform      Semen Analysis Post Vasectomy [[id number]]  Order: [id number]  Status:  Final result   Visible to patient:  Yes (MyChart) Dx:  Encounter for sterilization   Ref Range & Units 7d ago    Post Vas Analysis NOSPRM^No Sperm Seen No Sperm Seen     Resulting Agency  [facility]          Specimen Collected: 03/01/21  3:30 PM Last Resulted: 03/01/21  5:30 PM

## (undated) DEVICE — LINEN FULL SHEET 5511

## (undated) DEVICE — GLOVE PROTEXIS POWDER FREE SMT 7.5  2D72PT75X

## (undated) DEVICE — SUPPORTER ATHLETIC LG LATEX 202636

## (undated) DEVICE — ESU ELEC NDL 1" COATED/INSULATED E1465

## (undated) DEVICE — LINEN TOWEL PACK X10 5473

## (undated) DEVICE — BAG CLEAR TRASH 1.3M 39X33" P4040C

## (undated) DEVICE — DRAPE LAP PEDS DISP 29492

## (undated) DEVICE — SOL NACL 0.9% IRRIG 1000ML BOTTLE 2F7124

## (undated) DEVICE — PREP POVIDONE IODINE SOLUTION 10% 120ML

## (undated) DEVICE — PREP SKIN SCRUB TRAY 4461A

## (undated) DEVICE — PREP POVIDONE IODINE SCRUB 7.5% 120ML

## (undated) DEVICE — NDL BLUNT 18GA 1" W/O FILTER 305181

## (undated) DEVICE — LINEN HALF SHEET 5512

## (undated) DEVICE — SYR 10ML LL W/O NDL 302995

## (undated) DEVICE — PACK MINOR CUSTOM RIDGES SBA32RMRMA

## (undated) DEVICE — ESU GROUND PAD ADULT W/CORD E7507

## (undated) DEVICE — DRSG KERLIX FLUFFS X5

## (undated) RX ORDER — LIDOCAINE HYDROCHLORIDE 10 MG/ML
INJECTION, SOLUTION EPIDURAL; INFILTRATION; INTRACAUDAL; PERINEURAL
Status: DISPENSED
Start: 2020-11-13

## (undated) RX ORDER — PROPOFOL 10 MG/ML
INJECTION, EMULSION INTRAVENOUS
Status: DISPENSED
Start: 2020-11-13

## (undated) RX ORDER — LIDOCAINE HYDROCHLORIDE 20 MG/ML
INJECTION, SOLUTION INFILTRATION; PERINEURAL
Status: DISPENSED
Start: 2020-11-13

## (undated) RX ORDER — ONDANSETRON 2 MG/ML
INJECTION INTRAMUSCULAR; INTRAVENOUS
Status: DISPENSED
Start: 2020-11-13

## (undated) RX ORDER — GINSENG 100 MG
CAPSULE ORAL
Status: DISPENSED
Start: 2020-11-13

## (undated) RX ORDER — GLYCOPYRROLATE 0.2 MG/ML
INJECTION INTRAMUSCULAR; INTRAVENOUS
Status: DISPENSED
Start: 2020-11-13

## (undated) RX ORDER — FENTANYL CITRATE 50 UG/ML
INJECTION, SOLUTION INTRAMUSCULAR; INTRAVENOUS
Status: DISPENSED
Start: 2020-11-13

## (undated) RX ORDER — DEXAMETHASONE SODIUM PHOSPHATE 4 MG/ML
INJECTION, SOLUTION INTRA-ARTICULAR; INTRALESIONAL; INTRAMUSCULAR; INTRAVENOUS; SOFT TISSUE
Status: DISPENSED
Start: 2020-11-13

## (undated) RX ORDER — CEFAZOLIN SODIUM 2 G/100ML
INJECTION, SOLUTION INTRAVENOUS
Status: DISPENSED
Start: 2020-11-13